# Patient Record
Sex: FEMALE | Race: WHITE | NOT HISPANIC OR LATINO | Employment: UNEMPLOYED | URBAN - METROPOLITAN AREA
[De-identification: names, ages, dates, MRNs, and addresses within clinical notes are randomized per-mention and may not be internally consistent; named-entity substitution may affect disease eponyms.]

---

## 2019-05-24 ENCOUNTER — TELEPHONE (OUTPATIENT)
Dept: SCHEDULING | Facility: IMAGING CENTER | Age: 54
End: 2019-05-24

## 2019-09-06 ENCOUNTER — HOSPITAL ENCOUNTER (OUTPATIENT)
Facility: MEDICAL CENTER | Age: 54
End: 2019-09-06
Attending: INTERNAL MEDICINE
Payer: COMMERCIAL

## 2019-09-06 ENCOUNTER — OFFICE VISIT (OUTPATIENT)
Dept: MEDICAL GROUP | Facility: MEDICAL CENTER | Age: 54
End: 2019-09-06
Payer: COMMERCIAL

## 2019-09-06 VITALS
OXYGEN SATURATION: 98 % | BODY MASS INDEX: 26.96 KG/M2 | WEIGHT: 171.74 LBS | SYSTOLIC BLOOD PRESSURE: 100 MMHG | HEART RATE: 72 BPM | TEMPERATURE: 97.3 F | DIASTOLIC BLOOD PRESSURE: 60 MMHG | HEIGHT: 67 IN

## 2019-09-06 DIAGNOSIS — E55.9 HYPOVITAMINOSIS D: ICD-10-CM

## 2019-09-06 DIAGNOSIS — M79.2 PERIPHERAL NEUROPATHIC PAIN: ICD-10-CM

## 2019-09-06 DIAGNOSIS — R53.83 FATIGUE, UNSPECIFIED TYPE: ICD-10-CM

## 2019-09-06 DIAGNOSIS — Z12.39 SCREENING FOR MALIGNANT NEOPLASM OF BREAST: ICD-10-CM

## 2019-09-06 DIAGNOSIS — M54.2 NECK PAIN: ICD-10-CM

## 2019-09-06 DIAGNOSIS — Z00.00 HEALTH CARE MAINTENANCE: ICD-10-CM

## 2019-09-06 DIAGNOSIS — E78.5 DYSLIPIDEMIA: ICD-10-CM

## 2019-09-06 DIAGNOSIS — Z76.89 ENCOUNTER TO ESTABLISH CARE: ICD-10-CM

## 2019-09-06 DIAGNOSIS — Z11.59 NEED FOR HEPATITIS C SCREENING TEST: ICD-10-CM

## 2019-09-06 DIAGNOSIS — K76.0 FATTY LIVER: ICD-10-CM

## 2019-09-06 DIAGNOSIS — M17.9 OSTEOARTHRITIS OF KNEE, UNSPECIFIED LATERALITY, UNSPECIFIED OSTEOARTHRITIS TYPE: ICD-10-CM

## 2019-09-06 PROCEDURE — 80307 DRUG TEST PRSMV CHEM ANLYZR: CPT

## 2019-09-06 PROCEDURE — 99204 OFFICE O/P NEW MOD 45 MIN: CPT | Performed by: INTERNAL MEDICINE

## 2019-09-06 RX ORDER — OXYCODONE HYDROCHLORIDE AND ACETAMINOPHEN 5; 325 MG/1; MG/1
1-2 TABLET ORAL EVERY 4 HOURS PRN
COMMUNITY
End: 2019-10-09 | Stop reason: SDUPTHER

## 2019-09-06 RX ORDER — CARISOPRODOL 350 MG/1
350 TABLET ORAL 4 TIMES DAILY
COMMUNITY
End: 2021-04-29 | Stop reason: SDUPTHER

## 2019-09-06 RX ORDER — FLUCONAZOLE 10 MG/ML
6 POWDER, FOR SUSPENSION ORAL DAILY
COMMUNITY
End: 2021-04-29 | Stop reason: SDUPTHER

## 2019-09-06 RX ORDER — OXYCODONE HYDROCHLORIDE 5 MG/1
5 TABLET ORAL EVERY 4 HOURS PRN
COMMUNITY
End: 2019-10-09

## 2019-09-06 RX ORDER — DIAZEPAM 5 MG/1
5 TABLET ORAL EVERY 6 HOURS PRN
COMMUNITY
End: 2019-10-09

## 2019-09-06 ASSESSMENT — PATIENT HEALTH QUESTIONNAIRE - PHQ9: CLINICAL INTERPRETATION OF PHQ2 SCORE: 0

## 2019-09-06 NOTE — PROGRESS NOTES
CHIEF COMPLAINT  Chief Complaint   Patient presents with   • Establish Care     New Patient     HPI  Patient is a 54 y.o. female patient who presents today for the following     Neck pain, knee pain, neuropathic pain, fatigue  - Onset: remote  - Triger: multiple procedures   -Unknown etiology for neuropathic pain  - located in: lower back  - intensity:  mild to moderate  - quality:  dull and sharp   - radiation:  no  - alleviating factors are:  rest, pain medication occasionally, noted daily   -She weaned her off of all medication, except the above  -  exacerbating factors are:  activity  - accompanied:    - no numbness, weakness, tingling, fever, chills  - course: Improved but persistent  - imaging: Multiple  - treatment: as above    No reported history of:  - chronic immune suppression, alcoholism, IV drug abuse, indwelling catheter, diabetes.    - No history of recent spinal surgery or injection.    Denies:  - numbness/saddle anesthesia.  - bowel/bladder changes, fever.   - trauma  - nausea/vomiting  - chest pain, shortness of breath, abdominal pain.       Hypovitaminosis D  The patient had low vitamin D level.  Vitamin D supplement: no.    Dyslipidemia, fatty liver  The patient had abnormal FLP, no medications  Diet: Healthy  Exercise: Limited due to pain  FH: Parents  Previous imaging showed fatty liver.    Reviewed PMH, PSH, FH, SH, ALL, HCM/IMM, no changes  Reviewed MEDS, no changes    CURRENT MEDICATIONS  Current Outpatient Medications   Medication Sig Dispense Refill   • oxyCODONE-acetaminophen (PERCOCET) 5-325 MG Tab Take 1-2 Tabs by mouth every four hours as needed.     • diazePAM (VALIUM) 5 MG Tab Take 5 mg by mouth every 6 hours as needed for Anxiety.     • fluconazole (DIFLUCAN) 10 MG/ML Recon Susp Take 6 mg/kg by mouth every day.     • oxyCODONE immediate-release (ROXICODONE) 5 MG Tab Take 5 mg by mouth every four hours as needed for Severe Pain.     • metroNIDAZOLE (FLAGYL) 5-0.79 MG/ML-% Solution  500 mg by Intravenous route every 8 hours.     • carisoprodol (SOMA) 350 MG Tab Take 350 mg by mouth 4 times a day.       No current facility-administered medications for this visit.      ALLERGIES  Allergies: Bee venom; Codeine; Iodine; Penicillins; Vancomycin; Aspirin; Ibuprofen; Latex; and Percocet [apap-fd&c red #40 [place]-oxycodone]  PAST MEDICAL HISTORY  Past Medical History:   Diagnosis Date   • Dyslipidemia    • Fatty liver    • Hypoglycemia    • Hypovitaminosis D    • Knee osteoarthritis    • Peripheral neuropathy      SURGICAL HISTORY  She  has a past surgical history that includes primary c section and ankle arthroscopy.  SOCIAL HISTORY  Social History     Tobacco Use   • Smoking status: Not on file   Substance Use Topics   • Alcohol use: Not on file   • Drug use: Not on file     Social History     Social History Narrative   • Not on file     FAMILY HISTORY  Family History   Problem Relation Age of Onset   • Diabetes Mother    • Hypertension Mother    • Hyperlipidemia Mother    • Heart Disease Father    • Hypertension Father    • Hyperlipidemia Father    • No Known Problems Sister    • No Known Problems Brother      Family Status   Relation Name Status   • Mo  Alive   • Fa  Alive   • Sis  Alive   • Bro  Alive       ROS   Constitutional: Negative for fever, chills.  HENT: Negative for congestion, sore throat.  Eyes: Negative for blurred vision.   Respiratory: Negative for cough, shortness of breath.  Cardiovascular: Negative for chest pain, palpitations.   Gastrointestinal: Negative for heartburn, nausea, abdominal pain.   Genitourinary: Negative for dysuria.  Musculoskeletal: as above.  Skin: Negative for rash and itching.   Neuro: Negative for dizziness, weakness and headaches.   Endo/Heme/Allergies: Does not bruise/bleed easily.   Psychiatric/Behavioral: Negative for depression, anxiety    PHYSICAL EXAM   Blood Pressure 100/60   Pulse 72   Temperature 36.3 °C (97.3 °F) (Temporal)   Height 1.702 m  "(5' 7\")   Weight 77.9 kg (171 lb 11.8 oz)   Oxygen Saturation 98%  Body mass index is 26.9 kg/m².  General:  NAD, well appearing  HEENT:   NC/AT, PERRLA, EOMI, TMs are clear. Oropharyngeal mucosa is pink,  without lesions;  no cervical / supraclavicular  lymphadenopathy, no thyromegaly.    Cardiovascular: RRR.   No m/r/g. No carotid bruits .      Lungs:   CTAB, no w/r/r, no respiratory distress.  Abdomen: Soft, NT/ND + BS; no suprapubic tenderness; no masses or hepatosplenomegaly.  Extremities:  2+ DP and radial pulses bilaterally.  No c/c/e.   Skin:  Warm, dry.  No erythema. No rash.   Neurologic: Alert & oriented x 3. CN II-XII grossly intact. No focal deficits.  Psychiatric:  Affect normal, mood normal, judgment normal.  MS: no TTP over spine/paraspinal muscles; muscle spasm.    LABS     Pending    IMAGING     None    ASSESMENT AND PLAN        1. Neck pain  On oxycodone as needed, not daily  - Controlled Substance Treatment Agreement  - Pain Management Screen; Future  2. Osteoarthritis of knee, unspecified laterality, unspecified osteoarthritis type  3. Peripheral neuropathic pain  - Controlled Substance Treatment Agreement  - Pain Management Screen; Future    4. Fatigue, unspecified type  Pending labs  - CBC WITH DIFFERENTIAL; Future  - TSH; Future    5. Hypovitaminosis D  Pending the labs  - VITAMIN D,25 HYDROXY; Future    6. Dyslipidemia  Pending labs, advised to continue current lifestyle, and exercise as tolerated  - Comp Metabolic Panel; Future  - Lipid Profile; Future    7. Fatty liver  Pending labs    8. Screening for malignant neoplasm of breast  - MA-SCREEN MAMMO W/CAD-BILAT; Future    9. Need for hepatitis C screening test  - HEP C VIRUS ANTIBODY; Future    10. Health care maintenance  Pending records for colonoscopy    11. Encounter to establish care  Reviewed PMH, PSH, FH, SH, ALL, MEDS, HCM/IMM.     Counseling:   - Smoking:  Nonsmoker    Followup: Return in about 4 weeks (around 10/4/2019).    All " questions are answered.    Please note that this dictation was created using voice recognition software, and that there might be errors of marisa and possibly content.

## 2019-09-07 ENCOUNTER — HOSPITAL ENCOUNTER (OUTPATIENT)
Dept: LAB | Facility: MEDICAL CENTER | Age: 54
End: 2019-09-07
Attending: INTERNAL MEDICINE
Payer: COMMERCIAL

## 2019-09-07 DIAGNOSIS — R53.83 FATIGUE, UNSPECIFIED TYPE: ICD-10-CM

## 2019-09-07 DIAGNOSIS — Z12.39 SCREENING FOR MALIGNANT NEOPLASM OF BREAST: ICD-10-CM

## 2019-09-07 DIAGNOSIS — E55.9 HYPOVITAMINOSIS D: ICD-10-CM

## 2019-09-07 DIAGNOSIS — E78.5 DYSLIPIDEMIA: ICD-10-CM

## 2019-09-07 PROBLEM — Z00.00 HEALTH CARE MAINTENANCE: Status: ACTIVE | Noted: 2019-09-07

## 2019-09-07 PROBLEM — M54.2 NECK PAIN: Status: ACTIVE | Noted: 2019-09-07

## 2019-09-07 PROBLEM — M79.2 PERIPHERAL NEUROPATHIC PAIN: Status: ACTIVE | Noted: 2019-09-07

## 2019-09-07 LAB
25(OH)D3 SERPL-MCNC: 23 NG/ML (ref 30–100)
ALBUMIN SERPL BCP-MCNC: 4.3 G/DL (ref 3.2–4.9)
ALBUMIN/GLOB SERPL: 1.7 G/DL
ALP SERPL-CCNC: 54 U/L (ref 30–99)
ALT SERPL-CCNC: 11 U/L (ref 2–50)
ANION GAP SERPL CALC-SCNC: 8 MMOL/L (ref 0–11.9)
AST SERPL-CCNC: 16 U/L (ref 12–45)
BASOPHILS # BLD AUTO: 0.8 % (ref 0–1.8)
BASOPHILS # BLD: 0.03 K/UL (ref 0–0.12)
BILIRUB SERPL-MCNC: 0.9 MG/DL (ref 0.1–1.5)
BUN SERPL-MCNC: 20 MG/DL (ref 8–22)
CALCIUM SERPL-MCNC: 9.1 MG/DL (ref 8.5–10.5)
CHLORIDE SERPL-SCNC: 107 MMOL/L (ref 96–112)
CHOLEST SERPL-MCNC: 238 MG/DL (ref 100–199)
CO2 SERPL-SCNC: 23 MMOL/L (ref 20–33)
CREAT SERPL-MCNC: 0.81 MG/DL (ref 0.5–1.4)
EOSINOPHIL # BLD AUTO: 0.13 K/UL (ref 0–0.51)
EOSINOPHIL NFR BLD: 3.5 % (ref 0–6.9)
ERYTHROCYTE [DISTWIDTH] IN BLOOD BY AUTOMATED COUNT: 43.2 FL (ref 35.9–50)
FASTING STATUS PATIENT QL REPORTED: NORMAL
GLOBULIN SER CALC-MCNC: 2.5 G/DL (ref 1.9–3.5)
GLUCOSE SERPL-MCNC: 93 MG/DL (ref 65–99)
HCT VFR BLD AUTO: 43.6 % (ref 37–47)
HCV AB SER QL: NEGATIVE
HDLC SERPL-MCNC: 62 MG/DL
HGB BLD-MCNC: 14.2 G/DL (ref 12–16)
IMM GRANULOCYTES # BLD AUTO: 0.01 K/UL (ref 0–0.11)
IMM GRANULOCYTES NFR BLD AUTO: 0.3 % (ref 0–0.9)
LDLC SERPL CALC-MCNC: 159 MG/DL
LYMPHOCYTES # BLD AUTO: 1.72 K/UL (ref 1–4.8)
LYMPHOCYTES NFR BLD: 45.9 % (ref 22–41)
MCH RBC QN AUTO: 29.9 PG (ref 27–33)
MCHC RBC AUTO-ENTMCNC: 32.6 G/DL (ref 33.6–35)
MCV RBC AUTO: 91.8 FL (ref 81.4–97.8)
MONOCYTES # BLD AUTO: 0.32 K/UL (ref 0–0.85)
MONOCYTES NFR BLD AUTO: 8.5 % (ref 0–13.4)
NEUTROPHILS # BLD AUTO: 1.54 K/UL (ref 2–7.15)
NEUTROPHILS NFR BLD: 41 % (ref 44–72)
NRBC # BLD AUTO: 0 K/UL
NRBC BLD-RTO: 0 /100 WBC
PLATELET # BLD AUTO: 210 K/UL (ref 164–446)
PMV BLD AUTO: 9.6 FL (ref 9–12.9)
POTASSIUM SERPL-SCNC: 4 MMOL/L (ref 3.6–5.5)
PROT SERPL-MCNC: 6.8 G/DL (ref 6–8.2)
RBC # BLD AUTO: 4.75 M/UL (ref 4.2–5.4)
SODIUM SERPL-SCNC: 138 MMOL/L (ref 135–145)
TRIGL SERPL-MCNC: 83 MG/DL (ref 0–149)
TSH SERPL DL<=0.005 MIU/L-ACNC: 2.11 UIU/ML (ref 0.38–5.33)
WBC # BLD AUTO: 3.8 K/UL (ref 4.8–10.8)

## 2019-09-07 PROCEDURE — 84443 ASSAY THYROID STIM HORMONE: CPT

## 2019-09-07 PROCEDURE — 36415 COLL VENOUS BLD VENIPUNCTURE: CPT

## 2019-09-07 PROCEDURE — 82306 VITAMIN D 25 HYDROXY: CPT

## 2019-09-07 PROCEDURE — 80053 COMPREHEN METABOLIC PANEL: CPT

## 2019-09-07 PROCEDURE — 85025 COMPLETE CBC W/AUTO DIFF WBC: CPT

## 2019-09-07 PROCEDURE — 86803 HEPATITIS C AB TEST: CPT

## 2019-09-07 PROCEDURE — 80061 LIPID PANEL: CPT

## 2019-09-11 LAB
6MAM UR QL: NOT DETECTED
7AMINOCLONAZEPAM UR QL: NOT DETECTED
A-OH ALPRAZ UR QL: NOT DETECTED
ALPRAZ UR QL: NOT DETECTED
AMPHET UR QL SCN: NOT DETECTED
ANNOTATION COMMENT IMP: NORMAL
ANNOTATION COMMENT IMP: NORMAL
BARBITURATES UR QL: NOT DETECTED
BUPRENORPHINE UR QL: NOT DETECTED
BZE UR QL: NOT DETECTED
CARBOXYTHC UR QL: NOT DETECTED
CARISOPRODOL UR QL: NOT DETECTED
CLONAZEPAM UR QL: NOT DETECTED
CODEINE UR QL: NOT DETECTED
DIAZEPAM UR QL: NOT DETECTED
ETHYL GLUCURONIDE UR QL: NOT DETECTED
FENTANYL UR QL: NOT DETECTED
HYDROCODONE UR QL: NOT DETECTED
HYDROMORPHONE UR QL: NOT DETECTED
LORAZEPAM UR QL: NOT DETECTED
MDA UR QL: NOT DETECTED
MDEA UR QL: NOT DETECTED
MDMA UR QL: NOT DETECTED
MEPERIDINE UR QL: NOT DETECTED
METHADONE UR QL: NOT DETECTED
METHAMPHET UR QL: NOT DETECTED
MIDAZOLAM UR QL SCN: NOT DETECTED
MORPHINE UR QL: NOT DETECTED
NORBUPRENORPHINE UR QL CFM: NOT DETECTED
NORDIAZEPAM UR QL: NOT DETECTED
NORFENTANYL UR QL: NOT DETECTED
NORHYDROCODONE UR QL CFM: NOT DETECTED
NOROXYCODONE UR QL CFM: NOT DETECTED
NOROXYMORPH CO100 Q0458: NOT DETECTED
OXAZEPAM UR QL: NOT DETECTED
OXYCODONE UR QL: NOT DETECTED
OXYMORPHONE UR QL: NOT DETECTED
PATHOLOGY STUDY: NORMAL
PCP UR QL: NOT DETECTED
PHENTERMINE UR QL: NOT DETECTED
PPAA UR QL: NOT DETECTED
PROPOXYPH UR QL: NOT DETECTED
SERVICE CMNT-IMP: NORMAL
TAPENADOL OSULF CO200 Q0473: NOT DETECTED
TAPENTADOL UR QL SCN: NOT DETECTED
TEMAZEPAM UR QL: NOT DETECTED
TRAMADOL UR QL: NOT DETECTED
ZOLPIDEM UR QL: NOT DETECTED

## 2019-10-09 ENCOUNTER — OFFICE VISIT (OUTPATIENT)
Dept: MEDICAL GROUP | Facility: MEDICAL CENTER | Age: 54
End: 2019-10-09
Payer: COMMERCIAL

## 2019-10-09 VITALS
HEIGHT: 67 IN | HEART RATE: 87 BPM | WEIGHT: 173.72 LBS | BODY MASS INDEX: 27.27 KG/M2 | OXYGEN SATURATION: 93 % | DIASTOLIC BLOOD PRESSURE: 72 MMHG | TEMPERATURE: 97.7 F | SYSTOLIC BLOOD PRESSURE: 120 MMHG

## 2019-10-09 DIAGNOSIS — E55.9 HYPOVITAMINOSIS D: ICD-10-CM

## 2019-10-09 DIAGNOSIS — E78.00 HYPERCHOLESTEROLEMIA: ICD-10-CM

## 2019-10-09 DIAGNOSIS — Z00.00 HEALTHCARE MAINTENANCE: ICD-10-CM

## 2019-10-09 DIAGNOSIS — K76.0 FATTY LIVER: ICD-10-CM

## 2019-10-09 DIAGNOSIS — M62.838 MUSCLE SPASM: ICD-10-CM

## 2019-10-09 DIAGNOSIS — M79.2 PERIPHERAL NEUROPATHIC PAIN: ICD-10-CM

## 2019-10-09 DIAGNOSIS — M54.2 NECK PAIN: ICD-10-CM

## 2019-10-09 DIAGNOSIS — Z79.899 CONTROLLED SUBSTANCE AGREEMENT SIGNED: ICD-10-CM

## 2019-10-09 DIAGNOSIS — D72.819 LEUKOPENIA, UNSPECIFIED TYPE: ICD-10-CM

## 2019-10-09 PROCEDURE — 99214 OFFICE O/P EST MOD 30 MIN: CPT | Performed by: INTERNAL MEDICINE

## 2019-10-09 RX ORDER — CARISOPRODOL 350 MG/1
350 TABLET ORAL 2 TIMES DAILY
Qty: 60 TAB | Refills: 0 | Status: SHIPPED | OUTPATIENT
Start: 2019-10-09 | End: 2020-03-02 | Stop reason: SDUPTHER

## 2019-10-09 RX ORDER — OXYCODONE HYDROCHLORIDE AND ACETAMINOPHEN 5; 325 MG/1; MG/1
1-2 TABLET ORAL EVERY 4 HOURS PRN
Qty: 60 TAB | Refills: 0 | Status: SHIPPED | OUTPATIENT
Start: 2019-10-09 | End: 2019-10-16 | Stop reason: SDUPTHER

## 2019-10-09 NOTE — PROGRESS NOTES
CHIEF COMPLAINT  Chief Complaint   Patient presents with   • Other     still tired, Can't sleep but 2 hours a night   • Medication Refill     HPI  Serena Rogers is a 54 y.o. female who presents today for the following     Neck pain, neuropathic pain, muscle spasm  Interval course  - Came for the first medication refill  - oxycodone, 3/25 mg BID prn, refill: 10/9/2019  -Controlled substance agreement: 9/6/2019  -Urine drug screen:   9/6/2019  -Opioid risk tool, 0:   10/9/2019    - Onset: remote  - Triger: multiple procedures              -Unknown etiology for neuropathic pain  - located in: lower back  - intensity: mild to moderate  - quality: dull and sharp   - radiation: no  - alleviating factors are: rest, pain medication occasionally, noted daily              -She weaned her off of all medication, except the above  - exacerbating factors are: activity  - accompanied:               - no numbness, weakness, tingling, fever, chills  - course: Improved but persistent  - imaging: Multiple  - treatment: as above     No reported history of:  - chronic immune suppression, alcoholism, IV drug abuse, indwelling catheter, diabetes.   - No history of recent spinal surgery or injection.     Denies:  - numbness/saddle anesthesia.  - bowel/bladder changes, fever.   - trauma  - nausea/vomiting  - chest pain, shortness of breath, abdominal pain.     Leukopenia  The patient had borderline low white blood cell count, without frequent infections, anorexia, weight loss.     Hypercholesterolemia, fatty liver  The patient had abnormal FLP, no medications  Diet: Healthy  Exercise: Limited due to pain  FH: Parents  Previous imaging showed fatty liver.     Hypovitaminosis D  The patient had low vitamin D level at 23.  Vitamin D supplement: started 1 month ago, OTC.    Reviewed PMH, PSH, FH, SH, ALL, HCM/IMM, no changes  Reviewed MEDS, no changes    Patient Active Problem List    Diagnosis Date Noted   • Neck pain 09/07/2019   •  Peripheral neuropathic pain 09/07/2019   • Fatigue 09/07/2019   • Dyslipidemia 09/07/2019   • Health care maintenance 09/07/2019     CURRENT MEDICATIONS  Current Outpatient Medications   Medication Sig Dispense Refill   • oxyCODONE-acetaminophen (PERCOCET) 5-325 MG Tab Take 1-2 Tabs by mouth every four hours as needed for up to 30 days. 60 Tab 0   • carisoprodol (SOMA) 350 MG Tab Take 1 Tab by mouth 2 Times a Day for 30 days. 60 Tab 0   • fluconazole (DIFLUCAN) 10 MG/ML Recon Susp Take 6 mg/kg by mouth every day.     • metroNIDAZOLE (FLAGYL) 5-0.79 MG/ML-% Solution 500 mg by Intravenous route every 8 hours.     • carisoprodol (SOMA) 350 MG Tab Take 350 mg by mouth 4 times a day.       No current facility-administered medications for this visit.      ALLERGIES  Allergies: Bee venom; Codeine; Iodine; Penicillins; Vancomycin; Aspirin; Ibuprofen; Latex; and Percocet [apap-fd&c red #40 [place]-oxycodone]  PAST MEDICAL HISTORY  Past Medical History:   Diagnosis Date   • Dyslipidemia    • Fatty liver    • Hypoglycemia    • Hypovitaminosis D    • Knee osteoarthritis    • Peripheral neuropathy      SURGICAL HISTORY  She  has a past surgical history that includes primary c section and ankle arthroscopy.  SOCIAL HISTORY  Social History     Tobacco Use   • Smoking status: Never Smoker   • Smokeless tobacco: Never Used   Substance Use Topics   • Alcohol use: Not on file   • Drug use: Not on file     Social History     Social History Narrative   • Not on file     FAMILY HISTORY  Family History   Problem Relation Age of Onset   • Diabetes Mother    • Hypertension Mother    • Hyperlipidemia Mother    • Heart Disease Father    • Hypertension Father    • Hyperlipidemia Father    • No Known Problems Sister    • No Known Problems Brother      Family Status   Relation Name Status   • Mo  Alive   • Fa  Alive   • Sis  Alive   • Bro  Alive     ROS   Constitutional: Negative for fever, chills, fatigue.  HENT: Negative for congestion, sore  "throat.  Eyes: Negative for vision problems.   Respiratory: Negative for cough, shortness of breath.  Cardiovascular: Negative for chest pain, palpitations.   Gastrointestinal: Negative for heartburn, nausea, abdominal pain.   Genitourinary: Negative for dysuria.  Musculoskeletal: As above.   Skin: Negative for rash.   Neuro: Negative for dizziness, weakness and headaches.   Endo/Heme/Allergies: Does not bruise/bleed easily.   Psychiatric/Behavioral: Negative for depression.    PHYSICAL EXAM   Blood Pressure 120/72   Pulse 87   Temperature 36.5 °C (97.7 °F) (Temporal)   Height 1.702 m (5' 7\")   Weight 78.8 kg (173 lb 11.6 oz)   Oxygen Saturation 93%  Body mass index is 27.21 kg/m².  General:  NAD, well appearing  HEENT:   NC/AT, PERRLA, EOMI, TMs are clear. Oropharyngeal mucosa is pink,  without lesions;  no cervical / supraclavicular  lymphadenopathy, no thyromegaly.    Cardiovascular: RRR.   No m/r/g.       Lungs:   CTAB, no w/r/r, no respiratory distress.  Abdomen: Soft, NT/ND; no hepatosplenomegaly.  Extremities:  2+ DP and radial pulses bilaterally.  No c/c/e.   Skin:  Warm, dry.  No erythema. No rash.   Neurologic: Alert & oriented x 3. CN II-XII grossly intact. No focal deficits.  Psychiatric:  Affect normal, mood normal, judgment normal.  MS: No tenderness to palpation over the spine or paraspinal muscles    Labs     Labs are reviewed and discussed with a patient  Lab Results   Component Value Date/Time    CHOLSTRLTOT 238 (H) 09/07/2019 09:09 AM     (H) 09/07/2019 09:09 AM    HDL 62 09/07/2019 09:09 AM    TRIGLYCERIDE 83 09/07/2019 09:09 AM       Lab Results   Component Value Date/Time    SODIUM 138 09/07/2019 09:09 AM    POTASSIUM 4.0 09/07/2019 09:09 AM    CHLORIDE 107 09/07/2019 09:09 AM    CO2 23 09/07/2019 09:09 AM    GLUCOSE 93 09/07/2019 09:09 AM    BUN 20 09/07/2019 09:09 AM    CREATININE 0.81 09/07/2019 09:09 AM     Lab Results   Component Value Date/Time    ALKPHOSPHAT 54 09/07/2019 " 09:09 AM    ASTSGOT 16 09/07/2019 09:09 AM    ALTSGPT 11 09/07/2019 09:09 AM    TBILIRUBIN 0.9 09/07/2019 09:09 AM      Lab Results   Component Value Date/Time    WBC 3.8 (L) 09/07/2019 09:09 AM    RBC 4.75 09/07/2019 09:09 AM    HEMOGLOBIN 14.2 09/07/2019 09:09 AM    HEMATOCRIT 43.6 09/07/2019 09:09 AM    MCV 91.8 09/07/2019 09:09 AM    MCH 29.9 09/07/2019 09:09 AM    MCHC 32.6 (L) 09/07/2019 09:09 AM    MPV 9.6 09/07/2019 09:09 AM    NEUTSPOLYS 41.00 (L) 09/07/2019 09:09 AM    LYMPHOCYTES 45.90 (H) 09/07/2019 09:09 AM    MONOCYTES 8.50 09/07/2019 09:09 AM    EOSINOPHILS 3.50 09/07/2019 09:09 AM    BASOPHILS 0.80 09/07/2019 09:09 AM      Imaging     None    Assessment and Plan     Serena Rogers is a 54 y.o. female    1. Neck pain  Controlled with small dose of Percocet, continue current treatment  - oxyCODONE-acetaminophen (PERCOCET) 5-325 MG Tab; Take 1-2 Tabs by mouth every four hours as needed for up to 30 days.  Dispense: 60 Tab; Refill: 0  2. Peripheral neuropathic pain  - oxyCODONE-acetaminophen (PERCOCET) 5-325 MG Tab; Take 1-2 Tabs by mouth every four hours as needed for up to 30 days.  Dispense: 60 Tab; Refill: 0  2. Peripheral neuropathic pain  - oxyCODONE-acetaminophen (PERCOCET) 5-325 MG Tab; Take 1-2 Tabs by mouth every four hours as needed for up to 30 days.  Dispense: 60 Tab; Refill: 0    3. Muscle spasm  Controlled, continue current treatment  - carisoprodol (SOMA) 350 MG Tab; Take 1 Tab by mouth 2 Times a Day for 30 days.  Dispense: 60 Tab; Refill: 0    4. Controlled substance agreement signed  Obtained and reviewed patient utilization report from [facility] pharmacy database on 10/9/2019 10:25 AM  prior to writing prescription for controlled substance II, III or IV per [place] bill . Based on assessment of the report, the prescription is medically necessary.     5. Leukopenia, unspecified type  Borderline, follow-up labs  - CBC WITH DIFFERENTIAL; Future    6.  Hypercholesterolemia  Discussed treatment options, patient prefers lifestyle modification with low calorie diet, daily exercise, weight control  - Comp Metabolic Panel; Future  - Lipid Profile; Future    7. Fatty liver  As above    8. Hypovitaminosis D  Continue current supplement, follow-up labs  - VITAMIN D,25 HYDROXY; Future    9.  Healthcare maintenance  Pending records for colonoscopy    Counseling:   - Smoking:  Nonsmoker    Followup: as needed for pain med refill    All questions are answered.    Please note that this dictation was created using voice recognition software, and that there might be errors of marisa and possibly content.

## 2019-10-09 NOTE — LETTER
[facility]  Mat Disla M.D.  [address]  [place] [address]  Fax: [phone]   Authorization for Release/Disclosure of   Protected Health Information   Name: SERENA RAMESH : 1965 SSN: [id number]   Address: [address]  [place] [address] Phone:    [phone] (home)    I authorize the entity listed below to release/disclose the PHI below to:   [facility]/Mat Disla M.D. and Mat Disla M.D.   Provider or Entity Name:  Dr. Gregorio Acuña     [facility]   Phone:  [phone]    Fax:  [phone]   Reason for request: continuity of care   Information to be released:    [  ] LAST COLONOSCOPY,  including any PATH REPORT and follow-up  [  ] LAST FIT/COLOGUARD RESULT [  ] LAST DEXA  [  ] LAST MAMMOGRAM  [  ] LAST PAP  [  ] LAST LABS [  ] RETINA EXAM REPORT  [  ] IMMUNIZATION RECORDS  [  ] Release all info      [  ] Check here and initial the line next to each item to release ALL health information INCLUDING  _____ Care and treatment for drug and / or alcohol abuse  _____ HIV testing, infection status, or AIDS  _____ Genetic Testing    DATES OF SERVICE OR TIME PERIOD TO BE DISCLOSED: _____________  I understand and acknowledge that:  * This Authorization may be revoked at any time by you in writing, except if your health information has already been used or disclosed.  * Your health information that will be used or disclosed as a result of you signing this authorization could be re-disclosed by the recipient. If this occurs, your re-disclosed health information may no longer be protected by State or Federal laws.  * You may refuse to sign this Authorization. Your refusal will not affect your ability to obtain treatment.  * This Authorization becomes effective upon signing and will  on (date) __________.      If no date is indicated, this Authorization will  one (1) year from the signature date.    Name: Serena Cheney  Ken    Signature:   Date:     10/9/2019       PLEASE FAX REQUESTED RECORDS BACK TO: [phone]

## 2019-10-09 NOTE — LETTER
[facility]  Mat Disla M.D.  [address]  [place] [address]  Fax: [phone]   Authorization for Release/Disclosure of   Protected Health Information   Name: SERENA RAMESH : 1965 SSN: [id number]   Address: [address]  [place] [address] Phone:    [phone] (home)    I authorize the entity listed below to release/disclose the PHI below to:   [facility]/Mat Disla M.D. and Mat Disla M.D.   Provider or Entity Name: Pain Management  Dr. John Hughes     [facility]   Phone:  [phone]    Fax:     Reason for request: continuity of care   Information to be released:    [  ] LAST COLONOSCOPY,  including any PATH REPORT and follow-up  [  ] LAST FIT/COLOGUARD RESULT [  ] LAST DEXA  [  ] LAST MAMMOGRAM  [  ] LAST PAP  [  ] LAST LABS [  ] RETINA EXAM REPORT  [  ] IMMUNIZATION RECORDS  [  ] Release all info      [  ] Check here and initial the line next to each item to release ALL health information INCLUDING  _____ Care and treatment for drug and / or alcohol abuse  _____ HIV testing, infection status, or AIDS  _____ Genetic Testing    DATES OF SERVICE OR TIME PERIOD TO BE DISCLOSED: _____________  I understand and acknowledge that:  * This Authorization may be revoked at any time by you in writing, except if your health information has already been used or disclosed.  * Your health information that will be used or disclosed as a result of you signing this authorization could be re-disclosed by the recipient. If this occurs, your re-disclosed health information may no longer be protected by State or Federal laws.  * You may refuse to sign this Authorization. Your refusal will not affect your ability to obtain treatment.  * This Authorization becomes effective upon signing and will  on (date) __________.      If no date is indicated, this Authorization will  one (1) year from the signature date.    Name: Serena Cheney  Ken    Signature:   Date:     10/9/2019       PLEASE FAX REQUESTED RECORDS BACK TO: [phone]

## 2019-10-16 DIAGNOSIS — M54.2 NECK PAIN: ICD-10-CM

## 2019-10-16 DIAGNOSIS — M79.2 PERIPHERAL NEUROPATHIC PAIN: ICD-10-CM

## 2019-10-16 RX ORDER — OXYCODONE HYDROCHLORIDE AND ACETAMINOPHEN 5; 325 MG/1; MG/1
1-2 TABLET ORAL EVERY 4 HOURS PRN
Qty: 60 TAB | Refills: 0 | Status: CANCELLED | OUTPATIENT
Start: 2019-10-16 | End: 2019-11-15

## 2019-10-16 RX ORDER — OXYCODONE HYDROCHLORIDE AND ACETAMINOPHEN 5; 325 MG/1; MG/1
1-2 TABLET ORAL EVERY 4 HOURS PRN
Qty: 60 TAB | Refills: 0 | Status: SHIPPED | OUTPATIENT
Start: 2019-10-16 | End: 2020-05-26 | Stop reason: SDUPTHER

## 2019-10-17 ENCOUNTER — TELEPHONE (OUTPATIENT)
Dept: MEDICAL GROUP | Facility: MEDICAL CENTER | Age: 54
End: 2019-10-17

## 2019-10-17 NOTE — TELEPHONE ENCOUNTER
Rc'd request for prior authorization for oxycodone/APAP 5[facility]     Contacted pts pharmacy insurance [employer], Provided all details requested for rx. Pt is new to plan so they need ICD10's and to confirm pt has been evaluated for opioid risks and morphine equivalent oxycodone rx is written for (oxycondone 10 mg/day equivalent to morphine 15 mg daily)     They will fax us within 5 days if approved.     Cindy Carranza, PharmD

## 2019-10-21 NOTE — TELEPHONE ENCOUNTER
FINAL PRIOR AUTHORIZATION STATUS:    1.  Name of Medication & Dose: oxyCODONE-acetaminophen (PERCOCET) 5-325 MG Tab     2. Prior Auth Status: Approved through Until patient does not need it     3. Action Taken: Pharmacy Notified: yes Patient Notified: yes

## 2019-11-01 ENCOUNTER — TELEPHONE (OUTPATIENT)
Dept: MEDICAL GROUP | Facility: MEDICAL CENTER | Age: 54
End: 2019-11-01

## 2019-11-01 NOTE — TELEPHONE ENCOUNTER
FINAL PRIOR AUTHORIZATION STATUS:    1.  Name of Medication & Dose: Percocet     2. Prior Auth Status: Approved through as long as patient needs it     3. Action Taken: Pharmacy Notified: yes Patient Notified:yes

## 2019-12-26 ENCOUNTER — OFFICE VISIT (OUTPATIENT)
Dept: MEDICAL GROUP | Facility: MEDICAL CENTER | Age: 54
End: 2019-12-26
Payer: COMMERCIAL

## 2019-12-26 VITALS
BODY MASS INDEX: 27.68 KG/M2 | SYSTOLIC BLOOD PRESSURE: 118 MMHG | HEIGHT: 67 IN | TEMPERATURE: 98.7 F | HEART RATE: 82 BPM | WEIGHT: 176.37 LBS | RESPIRATION RATE: 12 BRPM | OXYGEN SATURATION: 95 % | DIASTOLIC BLOOD PRESSURE: 64 MMHG

## 2019-12-26 DIAGNOSIS — Z79.899 CONTROLLED SUBSTANCE AGREEMENT SIGNED: ICD-10-CM

## 2019-12-26 DIAGNOSIS — Z00.00 HEALTHCARE MAINTENANCE: ICD-10-CM

## 2019-12-26 DIAGNOSIS — M62.838 MUSCLE SPASM: ICD-10-CM

## 2019-12-26 DIAGNOSIS — M54.2 NECK PAIN: ICD-10-CM

## 2019-12-26 DIAGNOSIS — M79.2 PERIPHERAL NEUROPATHIC PAIN: ICD-10-CM

## 2019-12-26 PROCEDURE — 99214 OFFICE O/P EST MOD 30 MIN: CPT | Performed by: INTERNAL MEDICINE

## 2019-12-26 RX ORDER — MECLIZINE HYDROCHLORIDE 25 MG/1
25 TABLET ORAL 3 TIMES DAILY PRN
Qty: 30 TAB | Refills: 0 | Status: SHIPPED | OUTPATIENT
Start: 2019-12-26 | End: 2021-04-29 | Stop reason: SDUPTHER

## 2019-12-26 RX ORDER — OXYCODONE HYDROCHLORIDE 5 MG/1
5 TABLET ORAL EVERY 4 HOURS PRN
Qty: 60 TAB | Refills: 0 | Status: SHIPPED | OUTPATIENT
Start: 2019-12-26 | End: 2020-03-02 | Stop reason: SDUPTHER

## 2019-12-26 NOTE — PROGRESS NOTES
CHIEF COMPLAINT  Chief Complaint   Patient presents with   • Medication Refill     HPI  Patient is a 54 y.o. female patient who presents today for the following     Neck pain, neuropathic pain, muscle spasm  Interval course  - Came for medication refill  - oxycodone/acetaminophen, 3/25 mg BID prn, refill: 10/9/2019   - prefers oxycodone w/o acetaminophen    -Controlled substance agreement:     9/6/2019  -Urine drug screen:                             9/6/2019  -Opioid risk tool, 0:                              10/9/2019     - Onset: remote  - Triger: multiple procedures  -Unknown etiology for neuropathic pain  - located in: lower back  - intensity: mild to moderate  - quality: dull and sharp   - radiation: no  - alleviating factors are: rest, pain medication occasionally, noted daily  -She weaned her off of all medication, except the above  - exacerbating factors are: activity  - accompanied:   - no numbness, weakness, tingling, fever, chills  - course: Improved but persistent  - imaging: Multiple  - treatment: as above     No reported history of:  - chronic immune suppression, alcoholism, IV drug abuse, indwelling catheter, diabetes.   - No history of recent spinal surgery or injection.     Denies:  - numbness/saddle anesthesia.  - bowel/bladder changes, fever.   - trauma  - nausea/vomiting  - chest pain, shortness of breath, abdominal pain.      Reviewed PMH, PSH, FH, SH, ALL, HCM/IMM, no changes  Reviewed MEDS, no changes    Patient Active Problem List    Diagnosis Date Noted   • Fatty liver 10/09/2019   • Hypovitaminosis D 10/09/2019   • Hypercholesterolemia 10/09/2019   • Leukopenia 10/09/2019   • Controlled substance agreement signed 10/09/2019   • Muscle spasm 10/09/2019   • Healthcare maintenance 10/09/2019   • Neck pain 09/07/2019   • Peripheral neuropathic pain 09/07/2019   • Fatigue 09/07/2019   • Dyslipidemia 09/07/2019   • Health care maintenance 09/07/2019     CURRENT MEDICATIONS  Current Outpatient  Medications   Medication Sig Dispense Refill   • oxyCODONE immediate-release (ROXICODONE) 5 MG Tab Take 1 Tab by mouth every four hours as needed for Severe Pain for up to 30 days. 60 Tab 0   • meclizine (ANTIVERT) 25 MG Tab Take 1 Tab by mouth 3 times a day as needed. 30 Tab 0   • fluconazole (DIFLUCAN) 10 MG/ML Recon Susp Take 6 mg/kg by mouth every day.     • metroNIDAZOLE (FLAGYL) 5-0.79 MG/ML-% Solution 500 mg by Intravenous route every 8 hours.     • carisoprodol (SOMA) 350 MG Tab Take 350 mg by mouth 4 times a day.       No current facility-administered medications for this visit.      ALLERGIES  Allergies: Bee venom; Codeine; Iodine; Penicillins; Vancomycin; Aspirin; Ibuprofen; Latex; and Percocet [apap-fd&c red #40 [place]-oxycodone]  PAST MEDICAL HISTORY  Past Medical History:   Diagnosis Date   • Dyslipidemia    • Fatty liver    • Hypoglycemia    • Hypovitaminosis D    • Knee osteoarthritis    • Peripheral neuropathy      SURGICAL HISTORY  She  has a past surgical history that includes primary c section and ankle arthroscopy.  SOCIAL HISTORY  Social History     Tobacco Use   • Smoking status: Never Smoker   • Smokeless tobacco: Never Used   Substance Use Topics   • Alcohol use: Not on file   • Drug use: Not on file     Social History     Patient does not qualify to have social determinant information on file (likely too young).   Social History Narrative   • Not on file     FAMILY HISTORY  Family History   Problem Relation Age of Onset   • Diabetes Mother    • Hypertension Mother    • Hyperlipidemia Mother    • Heart Disease Father    • Hypertension Father    • Hyperlipidemia Father    • No Known Problems Sister    • No Known Problems Brother      Family Status   Relation Name Status   • Mo  Alive   • Fa  Alive   • Sis  Alive   • Bro  Alive     ROS   Constitutional: Negative for fever, chills.  HENT: Negative for congestion, sore throat.  Eyes: Negative for blurred vision.   Respiratory: Negative for  "cough, shortness of breath.  Cardiovascular: Negative for chest pain, palpitations.   Gastrointestinal: Negative for constipation.   Genitourinary: Negative for dysuria.  Musculoskeletal: as above.  Skin: Negative for rash and itching.   Neuro: Negative for dizziness, weakness and headaches.   Endo/Heme/Allergies: Does not bruise/bleed easily.   Psychiatric/Behavioral: Negative for depression, anxiety    PHYSICAL EXAM   Blood Pressure 118/64   Pulse 82   Temperature 37.1 °C (98.7 °F)   Respiration 12   Height 1.702 m (5' 7\")   Weight 80 kg (176 lb 5.9 oz)   Oxygen Saturation 95%  Body mass index is 27.62 kg/m².  General:  NAD, well appearing  HEENT:   NC/AT, PERRLA, EOMI, TMs are clear. Oropharyngeal mucosa is pink,  without lesions;  no cervical / supraclavicular  lymphadenopathy, no thyromegaly.    Cardiovascular: RRR.   No m/r/g. No carotid bruits .      Lungs:   CTAB, no w/r/r, no respiratory distress.  Abdomen: Soft, NT/ND + BS.  Extremities:  2+ DP and radial pulses bilaterally.  No c/c/e.   Skin:  Warm, dry.  No erythema. No rash.   Neurologic: Alert & oriented x 3. CN II-XII grossly intact. No focal deficits.  Psychiatric:  Affect normal, mood normal, judgment normal.    LABS     Labs are reviewed and discussed with a patient  Lab Results   Component Value Date/Time    CHOLSTRLTOT 238 (H) 09/07/2019 09:09 AM     (H) 09/07/2019 09:09 AM    HDL 62 09/07/2019 09:09 AM    TRIGLYCERIDE 83 09/07/2019 09:09 AM       Lab Results   Component Value Date/Time    SODIUM 138 09/07/2019 09:09 AM    POTASSIUM 4.0 09/07/2019 09:09 AM    CHLORIDE 107 09/07/2019 09:09 AM    CO2 23 09/07/2019 09:09 AM    GLUCOSE 93 09/07/2019 09:09 AM    BUN 20 09/07/2019 09:09 AM    CREATININE 0.81 09/07/2019 09:09 AM     Lab Results   Component Value Date/Time    ALKPHOSPHAT 54 09/07/2019 09:09 AM    ASTSGOT 16 09/07/2019 09:09 AM    ALTSGPT 11 09/07/2019 09:09 AM    TBILIRUBIN 0.9 09/07/2019 09:09 AM      No results found for: " HBA1C  No results found for: TSH  No results found for: FREET4    Lab Results   Component Value Date/Time    WBC 3.8 (L) 09/07/2019 09:09 AM    RBC 4.75 09/07/2019 09:09 AM    HEMOGLOBIN 14.2 09/07/2019 09:09 AM    HEMATOCRIT 43.6 09/07/2019 09:09 AM    MCV 91.8 09/07/2019 09:09 AM    MCH 29.9 09/07/2019 09:09 AM    MCHC 32.6 (L) 09/07/2019 09:09 AM    MPV 9.6 09/07/2019 09:09 AM    NEUTSPOLYS 41.00 (L) 09/07/2019 09:09 AM    LYMPHOCYTES 45.90 (H) 09/07/2019 09:09 AM    MONOCYTES 8.50 09/07/2019 09:09 AM    EOSINOPHILS 3.50 09/07/2019 09:09 AM    BASOPHILS 0.80 09/07/2019 09:09 AM      IMAGING     None    ASSESMENT AND PLAN        1. Neck pain  Controlled, continue with current treatment.  - oxyCODONE immediate-release (ROXICODONE) 5 MG Tab; Take 1 Tab by mouth every four hours as needed for Severe Pain for up to 30 days.  Dispense: 60 Tab; Refill: 0  2. Peripheral neuropathic pain  - oxyCODONE immediate-release (ROXICODONE) 5 MG Tab; Take 1 Tab by mouth every four hours as needed for Severe Pain for up to 30 days.  Dispense: 60 Tab; Refill: 0    3. Muscle spasm  Still has muscle relaxant, helped    4. Controlled substance agreement signed  - oxyCODONE immediate-release (ROXICODONE) 5 MG Tab; Take 1 Tab by mouth every four hours as needed for Severe Pain for up to 30 days.  Dispense: 60 Tab; Refill: 0    Obtained and reviewed patient utilization report from [facility] pharmacy database on 12/26/2019 12:00 PM  prior to writing prescription for controlled substance II, III or IV per [place] bill . Based on assessment of the report, the prescription is medically necessary.     5. Healthcare maintenance  Due immunizations x 2    Counseling:   - Smoking:  Nonsmoker    Followup: Return if symptoms worsen or fail to improve.    All questions are answered.    Please note that this dictation was created using voice recognition software, and that there might be errors of marisa and possibly content.

## 2020-03-02 ENCOUNTER — OFFICE VISIT (OUTPATIENT)
Dept: MEDICAL GROUP | Facility: MEDICAL CENTER | Age: 55
End: 2020-03-02
Payer: COMMERCIAL

## 2020-03-02 VITALS
DIASTOLIC BLOOD PRESSURE: 72 MMHG | HEIGHT: 67 IN | WEIGHT: 179.45 LBS | SYSTOLIC BLOOD PRESSURE: 124 MMHG | TEMPERATURE: 97.2 F | RESPIRATION RATE: 12 BRPM | BODY MASS INDEX: 28.17 KG/M2 | OXYGEN SATURATION: 97 % | HEART RATE: 88 BPM

## 2020-03-02 DIAGNOSIS — Z79.899 CONTROLLED SUBSTANCE AGREEMENT SIGNED: ICD-10-CM

## 2020-03-02 DIAGNOSIS — M62.838 MUSCLE SPASM: ICD-10-CM

## 2020-03-02 DIAGNOSIS — M79.2 PERIPHERAL NEUROPATHIC PAIN: ICD-10-CM

## 2020-03-02 DIAGNOSIS — Z12.39 SCREENING FOR MALIGNANT NEOPLASM OF BREAST: ICD-10-CM

## 2020-03-02 DIAGNOSIS — M54.2 NECK PAIN: ICD-10-CM

## 2020-03-02 DIAGNOSIS — R53.83 FATIGUE, UNSPECIFIED TYPE: ICD-10-CM

## 2020-03-02 PROCEDURE — 99214 OFFICE O/P EST MOD 30 MIN: CPT | Performed by: INTERNAL MEDICINE

## 2020-03-02 RX ORDER — CARISOPRODOL 350 MG/1
350 TABLET ORAL 2 TIMES DAILY
Qty: 60 TAB | Refills: 0 | Status: SHIPPED | OUTPATIENT
Start: 2020-03-02 | End: 2020-05-26 | Stop reason: SDUPTHER

## 2020-03-02 RX ORDER — OXYCODONE HYDROCHLORIDE 5 MG/1
5 TABLET ORAL EVERY 4 HOURS PRN
Qty: 75 TAB | Refills: 0 | Status: SHIPPED | OUTPATIENT
Start: 2020-03-02 | End: 2020-08-10 | Stop reason: SDUPTHER

## 2020-03-02 ASSESSMENT — FIBROSIS 4 INDEX: FIB4 SCORE: 1.26

## 2020-03-02 ASSESSMENT — PATIENT HEALTH QUESTIONNAIRE - PHQ9: CLINICAL INTERPRETATION OF PHQ2 SCORE: 0

## 2020-03-02 NOTE — PROGRESS NOTES
CHIEF COMPLAINT  Neck pain, pain medicine refill    HPI  Patient is a 54 y.o. female patient who presents today for the following     Neck pain, neuropathic pain, muscle spasm, fatigue  Interval course  - Came for oxycodone refill  - oxycodone, 3/25 mg BID prn, refill: 12/26/2019  -Controlled substance agreement:     9/6/2019  -Urine drug screen:                             9/6/2019  -Opioid risk tool, 0:                              10/9/2019     - Onset: remote  - Triger: multiple procedures  -Unknown etiology for neuropathic pain  - located in: lower back  - intensity: mild to moderate  - quality: dull and sharp   - radiation: no  - alleviating factors are: rest, pain medication occasionally, noted daily  -She weaned her off of all medication, except the above  - exacerbating factors are: activity  - accompanied:   - no numbness, weakness, tingling, fever, chills  - course: Improved but persistent  - imaging: Multiple  - treatment: as above     No reported history of:  - chronic immune suppression, alcoholism, IV drug abuse, indwelling catheter, diabetes.   - No history of recent spinal surgery or injection.     Denies:  - numbness/saddle anesthesia.  - bowel/bladder changes, fever.   - trauma  - nausea/vomiting  - chest pain, shortness of breath, abdominal pain.    Reviewed PMH, PSH, FH, SH, ALL, HCM/IMM, no changes  Reviewed MEDS, no changes    Patient Active Problem List    Diagnosis Date Noted   • Fatty liver 10/09/2019   • Hypovitaminosis D 10/09/2019   • Hypercholesterolemia 10/09/2019   • Leukopenia 10/09/2019   • Controlled substance agreement signed 10/09/2019   • Muscle spasm 10/09/2019   • Healthcare maintenance 10/09/2019   • Neck pain 09/07/2019   • Peripheral neuropathic pain 09/07/2019   • Fatigue 09/07/2019   • Dyslipidemia 09/07/2019   • Health care maintenance 09/07/2019     CURRENT MEDICATIONS  Current Outpatient Medications   Medication Sig Dispense Refill   • meclizine (ANTIVERT) 25 MG Tab  Take 1 Tab by mouth 3 times a day as needed. 30 Tab 0   • fluconazole (DIFLUCAN) 10 MG/ML Recon Susp Take 6 mg/kg by mouth every day.     • metroNIDAZOLE (FLAGYL) 5-0.79 MG/ML-% Solution 500 mg by Intravenous route every 8 hours.     • carisoprodol (SOMA) 350 MG Tab Take 350 mg by mouth 4 times a day.       No current facility-administered medications for this visit.      ALLERGIES  Allergies: Bee venom; Codeine; Iodine; Penicillins; Vancomycin; Aspirin; Ibuprofen; Latex; and Percocet [apap-fd&c red #40 [place]-oxycodone]  PAST MEDICAL HISTORY  Past Medical History:   Diagnosis Date   • Dyslipidemia    • Fatty liver    • Hypoglycemia    • Hypovitaminosis D    • Knee osteoarthritis    • Peripheral neuropathy      SURGICAL HISTORY  She  has a past surgical history that includes primary c section and ankle arthroscopy.  SOCIAL HISTORY  Social History     Tobacco Use   • Smoking status: Never Smoker   • Smokeless tobacco: Never Used   Substance Use Topics   • Alcohol use: Not on file   • Drug use: Not on file     Social History     Social History Narrative   • Not on file     FAMILY HISTORY  Family History   Problem Relation Age of Onset   • Diabetes Mother    • Hypertension Mother    • Hyperlipidemia Mother    • Heart Disease Father    • Hypertension Father    • Hyperlipidemia Father    • No Known Problems Sister    • No Known Problems Brother      Family Status   Relation Name Status   • Mo  Alive   • Fa  Alive   • Sis  Alive   • Bro  Alive       ROS   Constitutional: Negative for fever, chills. C/o fatigue.  HENT: Negative for congestion, sore throat.  Eyes: Negative for blurred vision.   Respiratory: Negative for cough, shortness of breath.  Cardiovascular: Negative for chest pain, palpitations.   Gastrointestinal: Negative for heartburn, nausea, abdominal pain.   Genitourinary: Negative for dysuria.  Musculoskeletal: as above.  Skin: Negative for rash and itching.   Neuro: Negative for dizziness, weakness and  "headaches.   Endo/Heme/Allergies: Does not bruise/bleed easily.   Psychiatric/Behavioral: Negative for depression, anxiety    PHYSICAL EXAM   Blood Pressure 124/72 (BP Location: Left arm, Patient Position: Sitting, BP Cuff Size: Adult)   Pulse 88   Temperature 36.2 °C (97.2 °F) (Temporal)   Respiration 12   Height 1.702 m (5' 7\")   Weight 81.4 kg (179 lb 7.3 oz)   Oxygen Saturation 97%   Body Mass Index 28.11 kg/m²   General:  NAD, well appearing  HEENT:   NC/AT, PERRLA, EOMI, TMs are clear. Oropharyngeal mucosa is pink,  without lesions;  no cervical / supraclavicular  lymphadenopathy, no thyromegaly.    Cardiovascular: RRR.   No m/r/g. No carotid bruits .      Lungs:   CTAB, no w/r/r, no respiratory distress.  Abdomen: Soft, NT/ND + BS; no suprapubic tenderness; no masses or hepatosplenomegaly.  Extremities:  2+ DP and radial pulses bilaterally.  No c/c/e.   Skin:  Warm, dry.  No erythema. No rash.   Neurologic: Alert & oriented x 3. CN II-XII grossly intact. Brachioradialis / knee DTR are 2/4, symmetric. Strength and sensation grossly intact.  No focal deficits.  Psychiatric:  Affect normal, mood normal, judgment normal.  MS: no TTP over spine/paraspinal muscles; muscle spasm.    LABS     Labs are reviewed and discussed with a patient  Lab Results   Component Value Date/Time    CHOLSTRLTOT 238 (H) 09/07/2019 09:09 AM     (H) 09/07/2019 09:09 AM    HDL 62 09/07/2019 09:09 AM    TRIGLYCERIDE 83 09/07/2019 09:09 AM       Lab Results   Component Value Date/Time    SODIUM 138 09/07/2019 09:09 AM    POTASSIUM 4.0 09/07/2019 09:09 AM    CHLORIDE 107 09/07/2019 09:09 AM    CO2 23 09/07/2019 09:09 AM    GLUCOSE 93 09/07/2019 09:09 AM    BUN 20 09/07/2019 09:09 AM    CREATININE 0.81 09/07/2019 09:09 AM     Lab Results   Component Value Date/Time    ALKPHOSPHAT 54 09/07/2019 09:09 AM    ASTSGOT 16 09/07/2019 09:09 AM    ALTSGPT 11 09/07/2019 09:09 AM    TBILIRUBIN 0.9 09/07/2019 09:09 AM      Lab Results "   Component Value Date/Time    WBC 3.8 (L) 09/07/2019 09:09 AM    RBC 4.75 09/07/2019 09:09 AM    HEMOGLOBIN 14.2 09/07/2019 09:09 AM    HEMATOCRIT 43.6 09/07/2019 09:09 AM    MCV 91.8 09/07/2019 09:09 AM    MCH 29.9 09/07/2019 09:09 AM    MCHC 32.6 (L) 09/07/2019 09:09 AM    MPV 9.6 09/07/2019 09:09 AM    NEUTSPOLYS 41.00 (L) 09/07/2019 09:09 AM    LYMPHOCYTES 45.90 (H) 09/07/2019 09:09 AM    MONOCYTES 8.50 09/07/2019 09:09 AM    EOSINOPHILS 3.50 09/07/2019 09:09 AM    BASOPHILS 0.80 09/07/2019 09:09 AM      IMAGING     None    ASSESMENT AND PLAN        1. Neck pain  Controlled, continue activity as tolerated, refill:  - oxyCODONE immediate-release (ROXICODONE) 5 MG Tab; Take 1 Tab by mouth every four hours as needed for Severe Pain for up to 30 days.  Dispense: 75 Tab; Refill: 0  2. Peripheral neuropathic pain  - oxyCODONE immediate-release (ROXICODONE) 5 MG Tab; Take 1 Tab by mouth every four hours as needed for Severe Pain for up to 30 days.  Dispense: 75 Tab; Refill: 0  3. Muscle spasm  - carisoprodol (SOMA) 350 MG Tab; Take 1 Tab by mouth 2 Times a Day for 30 days.  Dispense: 60 Tab; Refill: 0    4. Controlled substance agreement signed  - oxyCODONE immediate-release (ROXICODONE) 5 MG Tab; Take 1 Tab by mouth every four hours as needed for Severe Pain for up to 30 days.  Dispense: 75 Tab; Refill: 0    Obtained and reviewed patient utilization report from [facility] pharmacy database on 3/2/2020 1:04 PM  prior to writing prescription for controlled substance II, III or IV per [place] bill . Based on assessment of the report, the prescription is medically necessary.     5. Screening for malignant neoplasm of breast  - MA-SCREENING MAMMO BILAT W/TOMOSYNTHESIS W/CAD; Future    6. Fatigue, unspecified type  - EBV CHRONIC/ACTIVE INFECTION; Future    Counseling:   - Smoking:  Nonsmoker    Followup: in 3 months and prn    All questions are answered.    Please note that this dictation was created using voice  recognition software, and that there might be errors of marisa and possibly content.

## 2020-05-26 ENCOUNTER — HOSPITAL ENCOUNTER (OUTPATIENT)
Dept: LAB | Facility: MEDICAL CENTER | Age: 55
End: 2020-05-26
Attending: INTERNAL MEDICINE
Payer: COMMERCIAL

## 2020-05-26 ENCOUNTER — OFFICE VISIT (OUTPATIENT)
Dept: MEDICAL GROUP | Age: 55
End: 2020-05-26
Payer: COMMERCIAL

## 2020-05-26 VITALS
BODY MASS INDEX: 28.56 KG/M2 | SYSTOLIC BLOOD PRESSURE: 130 MMHG | HEART RATE: 95 BPM | HEIGHT: 67 IN | OXYGEN SATURATION: 97 % | DIASTOLIC BLOOD PRESSURE: 72 MMHG | TEMPERATURE: 97.6 F | WEIGHT: 182 LBS

## 2020-05-26 DIAGNOSIS — M62.838 MUSCLE SPASM: ICD-10-CM

## 2020-05-26 DIAGNOSIS — R53.83 FATIGUE, UNSPECIFIED TYPE: ICD-10-CM

## 2020-05-26 DIAGNOSIS — M79.2 PERIPHERAL NEUROPATHIC PAIN: ICD-10-CM

## 2020-05-26 DIAGNOSIS — E78.00 HYPERCHOLESTEROLEMIA: ICD-10-CM

## 2020-05-26 DIAGNOSIS — D72.819 LEUKOPENIA, UNSPECIFIED TYPE: ICD-10-CM

## 2020-05-26 DIAGNOSIS — M25.50 ARTHRALGIA, UNSPECIFIED JOINT: ICD-10-CM

## 2020-05-26 DIAGNOSIS — Z79.899 CONTROLLED SUBSTANCE AGREEMENT SIGNED: ICD-10-CM

## 2020-05-26 DIAGNOSIS — M54.2 NECK PAIN: ICD-10-CM

## 2020-05-26 DIAGNOSIS — E55.9 HYPOVITAMINOSIS D: ICD-10-CM

## 2020-05-26 LAB
ALBUMIN SERPL BCP-MCNC: 4.5 G/DL (ref 3.2–4.9)
ALBUMIN/GLOB SERPL: 1.7 G/DL
ALP SERPL-CCNC: 64 U/L (ref 30–99)
ALT SERPL-CCNC: 15 U/L (ref 2–50)
ANION GAP SERPL CALC-SCNC: 10 MMOL/L (ref 7–16)
AST SERPL-CCNC: 17 U/L (ref 12–45)
BASOPHILS # BLD AUTO: 0.8 % (ref 0–1.8)
BASOPHILS # BLD: 0.03 K/UL (ref 0–0.12)
BILIRUB SERPL-MCNC: 0.8 MG/DL (ref 0.1–1.5)
BUN SERPL-MCNC: 18 MG/DL (ref 8–22)
CALCIUM SERPL-MCNC: 9.2 MG/DL (ref 8.5–10.5)
CHLORIDE SERPL-SCNC: 104 MMOL/L (ref 96–112)
CHOLEST SERPL-MCNC: 253 MG/DL (ref 100–199)
CO2 SERPL-SCNC: 24 MMOL/L (ref 20–33)
CREAT SERPL-MCNC: 0.69 MG/DL (ref 0.5–1.4)
CRP SERPL HS-MCNC: 0.21 MG/DL (ref 0–0.75)
EOSINOPHIL # BLD AUTO: 0.1 K/UL (ref 0–0.51)
EOSINOPHIL NFR BLD: 2.6 % (ref 0–6.9)
ERYTHROCYTE [DISTWIDTH] IN BLOOD BY AUTOMATED COUNT: 45.3 FL (ref 35.9–50)
ERYTHROCYTE [SEDIMENTATION RATE] IN BLOOD BY WESTERGREN METHOD: <1 MM/HOUR (ref 0–30)
FASTING STATUS PATIENT QL REPORTED: NORMAL
GLOBULIN SER CALC-MCNC: 2.6 G/DL (ref 1.9–3.5)
GLUCOSE SERPL-MCNC: 87 MG/DL (ref 65–99)
HCT VFR BLD AUTO: 45.4 % (ref 37–47)
HDLC SERPL-MCNC: 65 MG/DL
HGB BLD-MCNC: 14.9 G/DL (ref 12–16)
IMM GRANULOCYTES # BLD AUTO: 0.01 K/UL (ref 0–0.11)
IMM GRANULOCYTES NFR BLD AUTO: 0.3 % (ref 0–0.9)
LDLC SERPL CALC-MCNC: 169 MG/DL
LYMPHOCYTES # BLD AUTO: 1.72 K/UL (ref 1–4.8)
LYMPHOCYTES NFR BLD: 44.3 % (ref 22–41)
MCH RBC QN AUTO: 30.4 PG (ref 27–33)
MCHC RBC AUTO-ENTMCNC: 32.8 G/DL (ref 33.6–35)
MCV RBC AUTO: 92.7 FL (ref 81.4–97.8)
MONOCYTES # BLD AUTO: 0.33 K/UL (ref 0–0.85)
MONOCYTES NFR BLD AUTO: 8.5 % (ref 0–13.4)
NEUTROPHILS # BLD AUTO: 1.69 K/UL (ref 2–7.15)
NEUTROPHILS NFR BLD: 43.5 % (ref 44–72)
NRBC # BLD AUTO: 0 K/UL
NRBC BLD-RTO: 0 /100 WBC
PLATELET # BLD AUTO: 222 K/UL (ref 164–446)
PMV BLD AUTO: 9.9 FL (ref 9–12.9)
POTASSIUM SERPL-SCNC: 4.3 MMOL/L (ref 3.6–5.5)
PROT SERPL-MCNC: 7.1 G/DL (ref 6–8.2)
QORDR QORDR: NORMAL
RBC # BLD AUTO: 4.9 M/UL (ref 4.2–5.4)
RHEUMATOID FACT SER IA-ACNC: <10 IU/ML (ref 0–14)
SODIUM SERPL-SCNC: 138 MMOL/L (ref 135–145)
TRIGL SERPL-MCNC: 97 MG/DL (ref 0–149)
TSH SERPL DL<=0.005 MIU/L-ACNC: 1.9 UIU/ML (ref 0.38–5.33)
WBC # BLD AUTO: 3.9 K/UL (ref 4.8–10.8)

## 2020-05-26 PROCEDURE — 82306 VITAMIN D 25 HYDROXY: CPT

## 2020-05-26 PROCEDURE — 86663 EPSTEIN-BARR ANTIBODY: CPT

## 2020-05-26 PROCEDURE — 85652 RBC SED RATE AUTOMATED: CPT

## 2020-05-26 PROCEDURE — 80053 COMPREHEN METABOLIC PANEL: CPT

## 2020-05-26 PROCEDURE — 99214 OFFICE O/P EST MOD 30 MIN: CPT | Performed by: INTERNAL MEDICINE

## 2020-05-26 PROCEDURE — 80061 LIPID PANEL: CPT

## 2020-05-26 PROCEDURE — 86140 C-REACTIVE PROTEIN: CPT

## 2020-05-26 PROCEDURE — 86665 EPSTEIN-BARR CAPSID VCA: CPT

## 2020-05-26 PROCEDURE — 36415 COLL VENOUS BLD VENIPUNCTURE: CPT

## 2020-05-26 PROCEDURE — 86200 CCP ANTIBODY: CPT

## 2020-05-26 PROCEDURE — 86664 EPSTEIN-BARR NUCLEAR ANTIGEN: CPT

## 2020-05-26 PROCEDURE — 84443 ASSAY THYROID STIM HORMONE: CPT

## 2020-05-26 PROCEDURE — 86431 RHEUMATOID FACTOR QUANT: CPT

## 2020-05-26 PROCEDURE — 85025 COMPLETE CBC W/AUTO DIFF WBC: CPT

## 2020-05-26 RX ORDER — OXYCODONE HYDROCHLORIDE 5 MG/1
5 TABLET ORAL EVERY 4 HOURS PRN
Qty: 60 TAB | Refills: 0 | Status: SHIPPED | OUTPATIENT
Start: 2020-08-26 | End: 2020-08-10 | Stop reason: SDUPTHER

## 2020-05-26 RX ORDER — OXYCODONE HYDROCHLORIDE AND ACETAMINOPHEN 5; 325 MG/1; MG/1
1-2 TABLET ORAL EVERY 4 HOURS PRN
Qty: 60 TAB | Refills: 0 | Status: SHIPPED
Start: 2020-07-26 | End: 2020-08-10

## 2020-05-26 RX ORDER — HYDROCHLOROTHIAZIDE 12.5 MG/1
12.5 CAPSULE, GELATIN COATED ORAL DAILY
Qty: 90 CAP | Refills: 0 | Status: SHIPPED | OUTPATIENT
Start: 2020-05-26 | End: 2020-08-10 | Stop reason: SDUPTHER

## 2020-05-26 RX ORDER — OXYCODONE HYDROCHLORIDE AND ACETAMINOPHEN 5; 325 MG/1; MG/1
1-2 TABLET ORAL EVERY 4 HOURS PRN
Qty: 60 TAB | Refills: 0 | Status: SHIPPED | OUTPATIENT
Start: 2020-05-26 | End: 2020-06-25

## 2020-05-26 RX ORDER — CARISOPRODOL 350 MG/1
350 TABLET ORAL 2 TIMES DAILY
Qty: 60 TAB | Refills: 0 | Status: SHIPPED | OUTPATIENT
Start: 2020-05-26 | End: 2020-08-10 | Stop reason: SDUPTHER

## 2020-05-26 ASSESSMENT — FIBROSIS 4 INDEX: FIB4 SCORE: 1.26

## 2020-05-26 NOTE — PROGRESS NOTES
Telemedicine Visit: Established Patient     This Remote Face to Face encounter was conducted via Zoom. Given the importance of social distancing and other strategies recommended to reduce the risk of COVID-19 transmission, I am providing medical care to this patient via audio/video visit in place of an in person visit at the request of the patient. Verbal consent to telehealth, risks, benefits, and consequences were discussed. Patient retains the right to withdraw at any time. All existing confidentiality protections apply. The patient has access to all transmitted medical information. No dissemination of any patient images or information to other entities without further written consent.    CHIEF COMPLAINT   Neck pain, pain med refill    HPI  Serena Rogers is a 54 y.o. female who presents today for the following     Neck pain, neuropathic pain, arthralgia, muscle spasm, fatigue;  LE swelling  Interval course  - Came for oxycodone refill   - c/o muscle, neck, joints (hands, knees), fatigue, LE swelling  - oxycodone, 3/25 mg BID prn, refill: 3/20/2020; 5/26/2020  -Controlled substance agreement:     9/6/2019  -Urine drug screen:                             9/6/2019  -Opioid risk tool, 0:                              10/9/2019     - Onset: remote  - Triger: multiple procedures  -Unknown etiology for neuropathic pain  - located in: lower back  - intensity: mild to moderate  - quality: dull and sharp   - radiation: no  - alleviating factors are: rest, pain medication occasionally, noted daily  -She weaned her off of all medication, except the above  - exacerbating factors are: activity  - accompanied:   - no numbness, weakness, tingling, fever, chills  - course: Improved but persistent  - imaging: Multiple  - treatment: as above     No reported history of:  - chronic immune suppression, alcoholism, IV drug abuse, indwelling catheter, diabetes.   - No history of recent spinal surgery or injection.     Denies:  -  numbness/saddle anesthesia.  - bowel/bladder changes, fever.   - trauma  - nausea/vomiting  - chest pain, shortness of breath, abdominal pain.    Reviewed PMH, PSH, FH, SH, ALL, HCM/IMM, no changes  Reviewed MEDS, no changes    Patient Active Problem List    Diagnosis Date Noted   • Fatty liver 10/09/2019   • Hypovitaminosis D 10/09/2019   • Hypercholesterolemia 10/09/2019   • Leukopenia 10/09/2019   • Controlled substance agreement signed 10/09/2019   • Muscle spasm 10/09/2019   • Healthcare maintenance 10/09/2019   • Neck pain 09/07/2019   • Peripheral neuropathic pain 09/07/2019   • Fatigue 09/07/2019   • Dyslipidemia 09/07/2019   • Health care maintenance 09/07/2019     CURRENT MEDICATIONS  Current Outpatient Medications   Medication Sig Dispense Refill   • carisoprodol (SOMA) 350 MG Tab Take 1 Tab by mouth 2 Times a Day for 30 days. 60 Tab 0   • [START ON 8/26/2020] oxyCODONE immediate-release (ROXICODONE) 5 MG Tab Take 1 Tab by mouth every four hours as needed for Severe Pain for up to 30 days. 60 Tab 0   • [START ON 7/26/2020] oxyCODONE-acetaminophen (PERCOCET) 5-325 MG Tab Take 1-2 Tabs by mouth every four hours as needed for up to 30 days. 60 Tab 0   • oxyCODONE-acetaminophen (PERCOCET) 5-325 MG Tab Take 1-2 Tabs by mouth every four hours as needed for up to 30 days. 60 Tab 0   • meclizine (ANTIVERT) 25 MG Tab Take 1 Tab by mouth 3 times a day as needed. 30 Tab 0   • fluconazole (DIFLUCAN) 10 MG/ML Recon Susp Take 6 mg/kg by mouth every day.     • metroNIDAZOLE (FLAGYL) 5-0.79 MG/ML-% Solution 500 mg by Intravenous route every 8 hours.     • carisoprodol (SOMA) 350 MG Tab Take 350 mg by mouth 4 times a day.       No current facility-administered medications for this visit.      ALLERGIES  Allergies: Bee venom; Codeine; Iodine; Penicillins; Vancomycin; Aspirin; Ibuprofen; Latex; and Percocet [apap-fd&c red #40 [place]-oxycodone]  PAST MEDICAL HISTORY  Past Medical History:   Diagnosis Date   • Dyslipidemia   "  • Fatty liver    • Hypoglycemia    • Hypovitaminosis D    • Knee osteoarthritis    • Peripheral neuropathy      SURGICAL HISTORY  She  has a past surgical history that includes primary c section and ankle arthroscopy.  SOCIAL HISTORY  Social History     Tobacco Use   • Smoking status: Never Smoker   • Smokeless tobacco: Never Used   Substance Use Topics   • Alcohol use: Not on file   • Drug use: Not on file     Social History     Social History Narrative   • Not on file     FAMILY HISTORY  Family History   Problem Relation Age of Onset   • Diabetes Mother    • Hypertension Mother    • Hyperlipidemia Mother    • Heart Disease Father    • Hypertension Father    • Hyperlipidemia Father    • No Known Problems Sister    • No Known Problems Brother      Family Status   Relation Name Status   • Mo  Alive   • Fa  Alive   • Sis  Alive   • Bro  Alive       ROS   Constitutional: Negative for fever, chills.  HENT: Negative for congestion, sore throat.  Eyes: Negative for vision problems.   Respiratory: Negative for cough, shortness of breath.  Cardiovascular: Negative for chest pain, palpitations.   Gastrointestinal: Negative for heartburn, nausea, abdominal pain.   Genitourinary: Negative for dysuria.  Musculoskeletal: Per HPI.   Skin: Negative for rash.   Neuro: Negative for dizziness, weakness.   Endo/Heme/Allergies: Does not bruise/bleed easily.   Psychiatric/Behavioral: Negative for depression.    Objective   Blood Pressure 130/72 (BP Location: Left arm, Patient Position: Sitting, BP Cuff Size: Adult)   Pulse 95   Temperature 36.4 °C (97.6 °F) (Temporal)   Height 1.702 m (5' 7\")   Weight 82.6 kg (182 lb)   Oxygen Saturation 97%   Body Mass Index 28.51 kg/m²   Physical Exam:  Constitutional: Alert, no distress, well-groomed.  Skin: No rash in visible areas.  Eye: Round. Conjunctiva clear, lids normal.  ENMT: Lips pink without lesions, good dentition. Phonation normal.  Neck: No visible masses or thyromegaly. Moves " freely without pain.  CV: no peripheral cyanosis, tachycardia.  Respiratory: Unlabored respiratory effort, no cough or audible wheezing.  Psych: Alert and oriented x3, normal affect and mood.     Labs     Labs are reviewed and discussed with a patient  Lab Results   Component Value Date/Time    CHOLSTRLTOT 238 (H) 09/07/2019 09:09 AM     (H) 09/07/2019 09:09 AM    HDL 62 09/07/2019 09:09 AM    TRIGLYCERIDE 83 09/07/2019 09:09 AM       Lab Results   Component Value Date/Time    SODIUM 138 09/07/2019 09:09 AM    POTASSIUM 4.0 09/07/2019 09:09 AM    CHLORIDE 107 09/07/2019 09:09 AM    CO2 23 09/07/2019 09:09 AM    GLUCOSE 93 09/07/2019 09:09 AM    BUN 20 09/07/2019 09:09 AM    CREATININE 0.81 09/07/2019 09:09 AM     Lab Results   Component Value Date/Time    ALKPHOSPHAT 54 09/07/2019 09:09 AM    ASTSGOT 16 09/07/2019 09:09 AM    ALTSGPT 11 09/07/2019 09:09 AM    TBILIRUBIN 0.9 09/07/2019 09:09 AM      No results found for: HBA1C  No results found for: TSH  No results found for: FREET4    Lab Results   Component Value Date/Time    WBC 3.8 (L) 09/07/2019 09:09 AM    RBC 4.75 09/07/2019 09:09 AM    HEMOGLOBIN 14.2 09/07/2019 09:09 AM    HEMATOCRIT 43.6 09/07/2019 09:09 AM    MCV 91.8 09/07/2019 09:09 AM    MCH 29.9 09/07/2019 09:09 AM    MCHC 32.6 (L) 09/07/2019 09:09 AM    MPV 9.6 09/07/2019 09:09 AM    NEUTSPOLYS 41.00 (L) 09/07/2019 09:09 AM    LYMPHOCYTES 45.90 (H) 09/07/2019 09:09 AM    MONOCYTES 8.50 09/07/2019 09:09 AM    EOSINOPHILS 3.50 09/07/2019 09:09 AM    BASOPHILS 0.80 09/07/2019 09:09 AM      Imaging      None    Assessment and Plan     Serena Rogers is a 54 y.o. female    1. Neck pain  Controlled, continue with current treatment, activity as tolerated    - oxyCODONE immediate-release (ROXICODONE) 5 MG Tab; Take 1 Tab by mouth every four hours as needed for Severe Pain for up to 30 days.  Dispense: 60 Tab; Refill: 0  - oxyCODONE-acetaminophen (PERCOCET) 5-325 MG Tab; Take 1-2 Tabs by mouth every  four hours as needed for up to 30 days.  Dispense: 60 Tab; Refill: 0  - oxyCODONE-acetaminophen (PERCOCET) 5-325 MG Tab; Take 1-2 Tabs by mouth every four hours as needed for up to 30 days.  Dispense: 60 Tab; Refill: 0  2. Peripheral neuropathic pain  - oxyCODONE immediate-release (ROXICODONE) 5 MG Tab; Take 1 Tab by mouth every four hours as needed for Severe Pain for up to 30 days.  Dispense: 60 Tab; Refill: 0  - oxyCODONE-acetaminophen (PERCOCET) 5-325 MG Tab; Take 1-2 Tabs by mouth every four hours as needed for up to 30 days.  Dispense: 60 Tab; Refill: 0  - oxyCODONE-acetaminophen (PERCOCET) 5-325 MG Tab; Take 1-2 Tabs by mouth every four hours as needed for up to 30 days.  Dispense: 60 Tab; Refill: 0  3. Arthralgia, unspecified joint  - Sed Rate; Future  - CRP QUANTITIVE (NON-CARDIAC); Future  - RHEUMATOID ARTHRITIS FACTOR; Future  - CCP ANTIBODY; Future    4. Muscle spasm  Controlled, continue current treatment  - carisoprodol (SOMA) 350 MG Tab; Take 1 Tab by mouth 2 Times a Day for 30 days.  Dispense: 60 Tab; Refill: 0    5. Fatigue, unspecified type  Pending labs  - TSH WITH REFLEX TO FT4; Future    6. Controlled substance agreement signed  - oxyCODONE immediate-release (ROXICODONE) 5 MG Tab; Take 1 Tab by mouth every four hours as needed for Severe Pain for up to 30 days.  Dispense: 60 Tab; Refill: 0    Obtained and reviewed patient utilization report from [facility] database on 5/26/2020 12:45 PM  prior to writing prescription for controlled substance II, III or IV per [place] bill . Based on assessment of the report, the prescription is medically necessary.     Follow-up: In 3 months and as needed

## 2020-05-27 LAB — 25(OH)D3 SERPL-MCNC: 25 NG/ML (ref 30–100)

## 2020-05-28 LAB
CCP IGG SERPL-ACNC: 6 UNITS (ref 0–19)
EBV EA-D IGG SER-ACNC: 5.9 U/ML (ref 0–10.9)
EBV NA IGG SER IA-ACNC: 430 U/ML (ref 0–21.9)
EBV VCA IGG SER IA-ACNC: 427 U/ML (ref 0–21.9)

## 2020-08-10 ENCOUNTER — TELEMEDICINE (OUTPATIENT)
Dept: MEDICAL GROUP | Age: 55
End: 2020-08-10
Payer: COMMERCIAL

## 2020-08-10 VITALS — HEIGHT: 67 IN | WEIGHT: 180.78 LBS | BODY MASS INDEX: 28.37 KG/M2 | TEMPERATURE: 97.5 F

## 2020-08-10 DIAGNOSIS — M79.2 PERIPHERAL NEUROPATHIC PAIN: ICD-10-CM

## 2020-08-10 DIAGNOSIS — Z79.891 CHRONIC USE OF OPIATE DRUG FOR THERAPEUTIC PURPOSE: ICD-10-CM

## 2020-08-10 DIAGNOSIS — Z79.899 CONTROLLED SUBSTANCE AGREEMENT SIGNED: ICD-10-CM

## 2020-08-10 DIAGNOSIS — M54.2 NECK PAIN: ICD-10-CM

## 2020-08-10 DIAGNOSIS — Z12.31 ENCOUNTER FOR SCREENING MAMMOGRAM FOR MALIGNANT NEOPLASM OF BREAST: ICD-10-CM

## 2020-08-10 DIAGNOSIS — M62.838 MUSCLE SPASM: ICD-10-CM

## 2020-08-10 DIAGNOSIS — F11.20 UNCOMPLICATED OPIOID DEPENDENCE (HCC): ICD-10-CM

## 2020-08-10 DIAGNOSIS — M25.50 ARTHRALGIA, UNSPECIFIED JOINT: ICD-10-CM

## 2020-08-10 PROCEDURE — 99214 OFFICE O/P EST MOD 30 MIN: CPT | Mod: 95,CR | Performed by: INTERNAL MEDICINE

## 2020-08-10 RX ORDER — OXYCODONE HYDROCHLORIDE 5 MG/1
5 TABLET ORAL EVERY 4 HOURS PRN
Qty: 60 TAB | Refills: 0 | Status: SHIPPED | OUTPATIENT
Start: 2020-08-26 | End: 2020-12-22 | Stop reason: SDUPTHER

## 2020-08-10 RX ORDER — HYDROCHLOROTHIAZIDE 12.5 MG/1
12.5 CAPSULE, GELATIN COATED ORAL DAILY
Qty: 90 CAP | Refills: 0 | Status: SHIPPED | OUTPATIENT
Start: 2020-08-10 | End: 2021-04-29 | Stop reason: SDUPTHER

## 2020-08-10 RX ORDER — OXYCODONE HYDROCHLORIDE 5 MG/1
5 TABLET ORAL EVERY 4 HOURS PRN
Qty: 60 TAB | Refills: 0 | Status: SHIPPED | OUTPATIENT
Start: 2020-09-07 | End: 2020-12-22 | Stop reason: SDUPTHER

## 2020-08-10 RX ORDER — TRAZODONE HYDROCHLORIDE 50 MG/1
50 TABLET ORAL NIGHTLY PRN
Qty: 30 TAB | Refills: 3 | Status: SHIPPED | OUTPATIENT
Start: 2020-08-10 | End: 2021-04-29 | Stop reason: SDUPTHER

## 2020-08-10 RX ORDER — OXYCODONE HYDROCHLORIDE 5 MG/1
5 TABLET ORAL EVERY 4 HOURS PRN
Qty: 60 TAB | Refills: 0 | Status: SHIPPED | OUTPATIENT
Start: 2020-10-05 | End: 2020-12-22 | Stop reason: SDUPTHER

## 2020-08-10 RX ORDER — OXYCODONE HYDROCHLORIDE AND ACETAMINOPHEN 5; 325 MG/1; MG/1
1-2 TABLET ORAL EVERY 4 HOURS PRN
Qty: 60 TAB | Refills: 0 | Status: CANCELLED | OUTPATIENT
Start: 2020-08-10 | End: 2020-09-09

## 2020-08-10 RX ORDER — OXYCODONE HYDROCHLORIDE 5 MG/1
5 TABLET ORAL EVERY 4 HOURS PRN
Qty: 60 TAB | Refills: 0 | Status: SHIPPED | OUTPATIENT
Start: 2020-10-05 | End: 2020-08-10 | Stop reason: SDUPTHER

## 2020-08-10 RX ORDER — CARISOPRODOL 350 MG/1
350 TABLET ORAL 2 TIMES DAILY
Qty: 60 TAB | Refills: 2 | Status: SHIPPED | OUTPATIENT
Start: 2020-08-10 | End: 2020-12-22 | Stop reason: SDUPTHER

## 2020-08-10 RX ORDER — OXYCODONE HYDROCHLORIDE AND ACETAMINOPHEN 5; 325 MG/1; MG/1
1-2 TABLET ORAL EVERY 4 HOURS PRN
Qty: 60 TAB | Refills: 0 | Status: CANCELLED | OUTPATIENT
Start: 2020-09-07 | End: 2020-10-07

## 2020-08-10 ASSESSMENT — FIBROSIS 4 INDEX: FIB4 SCORE: 1.11

## 2020-12-22 ENCOUNTER — HOSPITAL ENCOUNTER (OUTPATIENT)
Facility: MEDICAL CENTER | Age: 55
End: 2020-12-22
Attending: INTERNAL MEDICINE
Payer: COMMERCIAL

## 2020-12-22 ENCOUNTER — OFFICE VISIT (OUTPATIENT)
Dept: MEDICAL GROUP | Age: 55
End: 2020-12-22
Payer: COMMERCIAL

## 2020-12-22 VITALS
RESPIRATION RATE: 14 BRPM | WEIGHT: 184 LBS | DIASTOLIC BLOOD PRESSURE: 90 MMHG | HEIGHT: 67 IN | SYSTOLIC BLOOD PRESSURE: 140 MMHG | TEMPERATURE: 97.9 F | BODY MASS INDEX: 28.88 KG/M2 | HEART RATE: 90 BPM | OXYGEN SATURATION: 97 %

## 2020-12-22 DIAGNOSIS — Z79.899 CONTROLLED SUBSTANCE AGREEMENT SIGNED: ICD-10-CM

## 2020-12-22 DIAGNOSIS — M25.50 ARTHRALGIA, UNSPECIFIED JOINT: ICD-10-CM

## 2020-12-22 DIAGNOSIS — Z79.891 CHRONIC USE OF OPIATE DRUG FOR THERAPEUTIC PURPOSE: ICD-10-CM

## 2020-12-22 DIAGNOSIS — M79.2 PERIPHERAL NEUROPATHIC PAIN: ICD-10-CM

## 2020-12-22 DIAGNOSIS — F11.20 UNCOMPLICATED OPIOID DEPENDENCE (HCC): ICD-10-CM

## 2020-12-22 DIAGNOSIS — K21.9 GASTROESOPHAGEAL REFLUX DISEASE, UNSPECIFIED WHETHER ESOPHAGITIS PRESENT: ICD-10-CM

## 2020-12-22 DIAGNOSIS — Z12.31 ENCOUNTER FOR SCREENING MAMMOGRAM FOR MALIGNANT NEOPLASM OF BREAST: ICD-10-CM

## 2020-12-22 DIAGNOSIS — M62.838 MUSCLE SPASM: ICD-10-CM

## 2020-12-22 DIAGNOSIS — G89.29 CHRONIC PAIN OF LEFT KNEE: ICD-10-CM

## 2020-12-22 DIAGNOSIS — R06.02 SOB (SHORTNESS OF BREATH): ICD-10-CM

## 2020-12-22 DIAGNOSIS — M54.2 NECK PAIN: ICD-10-CM

## 2020-12-22 DIAGNOSIS — M25.562 CHRONIC PAIN OF LEFT KNEE: ICD-10-CM

## 2020-12-22 PROCEDURE — 80307 DRUG TEST PRSMV CHEM ANLYZR: CPT

## 2020-12-22 PROCEDURE — 99214 OFFICE O/P EST MOD 30 MIN: CPT | Performed by: INTERNAL MEDICINE

## 2020-12-22 RX ORDER — OXYCODONE HYDROCHLORIDE 5 MG/1
5 TABLET ORAL EVERY 4 HOURS PRN
Qty: 60 TAB | Refills: 0 | Status: SHIPPED | OUTPATIENT
Start: 2020-12-22 | End: 2021-05-10 | Stop reason: SDUPTHER

## 2020-12-22 RX ORDER — CARISOPRODOL 350 MG/1
350 TABLET ORAL 2 TIMES DAILY
Qty: 60 TAB | Refills: 2 | Status: SHIPPED | OUTPATIENT
Start: 2020-12-22 | End: 2021-01-21

## 2020-12-22 RX ORDER — OXYCODONE HYDROCHLORIDE 5 MG/1
5 TABLET ORAL EVERY 4 HOURS PRN
Qty: 60 TAB | Refills: 0 | Status: SHIPPED | OUTPATIENT
Start: 2021-02-15 | End: 2021-05-10 | Stop reason: SDUPTHER

## 2020-12-22 RX ORDER — OXYCODONE HYDROCHLORIDE 5 MG/1
5 TABLET ORAL EVERY 4 HOURS PRN
Qty: 60 TAB | Refills: 0 | Status: SHIPPED | OUTPATIENT
Start: 2021-01-18 | End: 2021-05-10 | Stop reason: SDUPTHER

## 2020-12-22 RX ORDER — OMEPRAZOLE 20 MG/1
20 CAPSULE, DELAYED RELEASE ORAL DAILY
Qty: 90 CAP | Refills: 0 | Status: SHIPPED | OUTPATIENT
Start: 2020-12-22 | End: 2021-04-29 | Stop reason: SDUPTHER

## 2020-12-22 ASSESSMENT — FIBROSIS 4 INDEX: FIB4 SCORE: 1.11

## 2020-12-22 NOTE — PROGRESS NOTES
CHIEF COMPLAINT  Chief Complaint   Patient presents with   • Follow-Up     pain management     HPI  Serena Rogers is a 55 y.o. female who presents today for the following     Neck pain, neuropathic pain, arthralgia/knee pain, muscle spasm  LE swelling  - Stable with oxycodone w/o tylenol              - c/o muscle, neck, joints (hands, knees), fatigue, LE swelling  - oxycodone, 3/25 mg BID prn, refill: 5/26/2020, 8/10/2020  -Controlled substance agreement:     12/22/2020  -Urine drug screen:                             12/22/2020  -Opioid risk tool, 0:                              10/9/2019     - Onset: remote  - Triger: multiple procedures  -Unknown etiology for neuropathic pain  - located in: lower back  - intensity: mild to moderate  - quality: dull and sharp   - radiation: no  - alleviating factors are: rest, pain medication occasionally, noted daily  -She weaned her off of all medication, except the above  - exacerbating factors are: activity  - accompanied:   - no numbness, weakness, tingling, fever, chills  - course: Improved but persistent  - imaging: Multiple  - treatment: as above     No reported history of:  - chronic immune suppression, alcoholism, IV drug abuse, indwelling catheter, diabetes.   - No history of recent spinal surgery or injection.     Denies:  - numbness/saddle anesthesia.  - bowel/bladder changes, fever.   - trauma  - nausea/vomiting  - chest pain, shortness of breath, abdominal pain.     SOB  The patient has h/o asthma.  C/o increase SOB in the few yrs.  Denies palpitations, irregular heart beats.  No recent CXR.    GERD  C/o heartburn, no meds.   Denies:   - heartburn, epigastric pain.   -  nausea, vomiting, or diarrhea  - dysphagia  - abnormal cough  - blood in the stool or dark tarry stools.  - early satiety  - tobacco use.          Reviewed PMH, PSH, FH, SH, ALL, HCM/IMM, no changes  Reviewed MEDS, no changes    Patient Active Problem List    Diagnosis Date Noted   • Chronic use  of opiate drug for therapeutic purpose 08/10/2020   • Uncomplicated opioid dependence (HCC) 08/10/2020   • Encounter for screening mammogram for malignant neoplasm of breast 08/10/2020   • Arthralgia 05/26/2020   • Fatty liver 10/09/2019   • Hypovitaminosis D 10/09/2019   • Hypercholesterolemia 10/09/2019   • Leukopenia 10/09/2019   • Controlled substance agreement signed 10/09/2019   • Muscle spasm 10/09/2019   • Healthcare maintenance 10/09/2019   • Neck pain 09/07/2019   • Peripheral neuropathic pain 09/07/2019   • Fatigue 09/07/2019   • Dyslipidemia 09/07/2019   • Health care maintenance 09/07/2019     CURRENT MEDICATIONS  Current Outpatient Medications   Medication Sig Dispense Refill   • hydrochlorothiazide (MICROZIDE) 12.5 MG capsule Take 1 Cap by mouth every day. 90 Cap 0   • traZODone (DESYREL) 50 MG Tab Take 1 Tab by mouth at bedtime as needed for Sleep. 30 Tab 3   • meclizine (ANTIVERT) 25 MG Tab Take 1 Tab by mouth 3 times a day as needed. 30 Tab 0   • fluconazole (DIFLUCAN) 10 MG/ML Recon Susp Take 6 mg/kg by mouth every day.     • metroNIDAZOLE (FLAGYL) 5-0.79 MG/ML-% Solution 500 mg by Intravenous route every 8 hours.     • carisoprodol (SOMA) 350 MG Tab Take 350 mg by mouth 4 times a day.       No current facility-administered medications for this visit.      ALLERGIES  Allergies: Bee venom, Codeine, Iodine, Penicillins, Vancomycin, Aspirin, Ibuprofen, Latex, and Percocet [apap-fd&c red #40 [place]-oxycodone]  PAST MEDICAL HISTORY  Past Medical History:   Diagnosis Date   • Dyslipidemia    • Fatty liver    • Hypoglycemia    • Hypovitaminosis D    • Knee osteoarthritis    • Peripheral neuropathy      SURGICAL HISTORY  She  has a past surgical history that includes primary c section and ankle arthroscopy.  SOCIAL HISTORY  Social History     Tobacco Use   • Smoking status: Never Smoker   • Smokeless tobacco: Never Used   Substance Use Topics   • Alcohol use: Not on file   • Drug use: Not on file  "    Social History     Social History Narrative   • Not on file     FAMILY HISTORY  Family History   Problem Relation Age of Onset   • Diabetes Mother    • Hypertension Mother    • Hyperlipidemia Mother    • Heart Disease Father    • Hypertension Father    • Hyperlipidemia Father    • No Known Problems Sister    • No Known Problems Brother      Family Status   Relation Name Status   • Mo  Alive   • Fa  Alive   • Sis  Alive   • Bro  Alive       ROS   Constitutional: Negative for fever, chills, fatigue.  HENT: Negative for congestion, sore throat.  Eyes: Negative for vision problems.   Respiratory: Negative for cough, shortness of breath.  Cardiovascular: Negative for chest pain, palpitations.   Gastrointestinal: Negative for abdominal pain.   Genitourinary: Negative for dysuria.  Musculoskeletal: as above.   Skin: Negative for rash.   Neuro: Negative for dizziness, weakness and headaches.   Endo/Heme/Allergies: Does not bruise/bleed easily.   Psychiatric/Behavioral: Negative for depression.    PHYSICAL EXAM   Blood Pressure 140/90 (BP Location: Right arm, Patient Position: Sitting, BP Cuff Size: Adult)   Pulse 90   Temperature 36.6 °C (97.9 °F) (Temporal)   Respiration 14   Height 1.702 m (5' 7\")   Weight 83.5 kg (184 lb)   Oxygen Saturation 97%  Body mass index is 28.82 kg/m².  General:  NAD, well appearing  HEENT:   NC/AT, PERRLA, EOMI.  Cardiovascular: unlabored breathing, no peripheral cyanosis or swelling.     Lungs:   no respiratory distress.  Abdomen: non- distended.  Extremities:  No LE swelling.  Skin:  Warm, dry.  No erythema. No rash.   Neurologic: Alert & oriented x 3. CN II-XII grossly intact. No focal deficits.  Psychiatric:  Affect normal, mood normal, judgment normal.    Labs     Labs are reviewed and discussed with a patient  Lab Results   Component Value Date/Time    CHOLSTRLTOT 253 (H) 05/26/2020 10:36 AM     (H) 05/26/2020 10:36 AM    HDL 65 05/26/2020 10:36 AM    TRIGLYCERIDE 97 " 05/26/2020 10:36 AM       Lab Results   Component Value Date/Time    SODIUM 138 05/26/2020 10:36 AM    POTASSIUM 4.3 05/26/2020 10:36 AM    CHLORIDE 104 05/26/2020 10:36 AM    CO2 24 05/26/2020 10:36 AM    GLUCOSE 87 05/26/2020 10:36 AM    BUN 18 05/26/2020 10:36 AM    CREATININE 0.69 05/26/2020 10:36 AM     Lab Results   Component Value Date/Time    ALKPHOSPHAT 64 05/26/2020 10:36 AM    ASTSGOT 17 05/26/2020 10:36 AM    ALTSGPT 15 05/26/2020 10:36 AM    TBILIRUBIN 0.8 05/26/2020 10:36 AM      No results found for: HBA1C  No results found for: TSH  No results found for: FREET4    Lab Results   Component Value Date/Time    WBC 3.9 (L) 05/26/2020 10:36 AM    RBC 4.90 05/26/2020 10:36 AM    HEMOGLOBIN 14.9 05/26/2020 10:36 AM    HEMATOCRIT 45.4 05/26/2020 10:36 AM    MCV 92.7 05/26/2020 10:36 AM    MCH 30.4 05/26/2020 10:36 AM    MCHC 32.8 (L) 05/26/2020 10:36 AM    MPV 9.9 05/26/2020 10:36 AM    NEUTSPOLYS 43.50 (L) 05/26/2020 10:36 AM    LYMPHOCYTES 44.30 (H) 05/26/2020 10:36 AM    MONOCYTES 8.50 05/26/2020 10:36 AM    EOSINOPHILS 2.60 05/26/2020 10:36 AM    BASOPHILS 0.80 05/26/2020 10:36 AM      Imaging     Pending    Assessment and Plan     Serena Rogers is a 55 y.o. female    1. Neck pain  Controlled, continue with current treatment.  - URINE DRUG SCREEN; Future  - Controlled Substance Treatment Agreement  - oxyCODONE immediate-release (ROXICODONE) 5 MG Tab; Take 1 Tab by mouth every four hours as needed for Severe Pain for up to 30 days.  Dispense: 60 Tab; Refill: 0  - oxyCODONE immediate-release (ROXICODONE) 5 MG Tab; Take 1 Tab by mouth every four hours as needed for Severe Pain for up to 30 days.  Dispense: 60 Tab; Refill: 0  - oxyCODONE immediate-release (ROXICODONE) 5 MG Tab; Take 1 Tab by mouth every four hours as needed for Severe Pain for up to 30 days.  Dispense: 60 Tab; Refill: 0  - DX-CERVICAL SPINE-2 OR 3 VIEWS; Future  - REFERRAL TO PHYSICAL THERAPY  2. Peripheral neuropathic pain  - URINE DRUG  SCREEN; Future  - Controlled Substance Treatment Agreement  - oxyCODONE immediate-release (ROXICODONE) 5 MG Tab; Take 1 Tab by mouth every four hours as needed for Severe Pain for up to 30 days.  Dispense: 60 Tab; Refill: 0  - oxyCODONE immediate-release (ROXICODONE) 5 MG Tab; Take 1 Tab by mouth every four hours as needed for Severe Pain for up to 30 days.  Dispense: 60 Tab; Refill: 0  - oxyCODONE immediate-release (ROXICODONE) 5 MG Tab; Take 1 Tab by mouth every four hours as needed for Severe Pain for up to 30 days.  Dispense: 60 Tab; Refill: 0  3. Arthralgia, unspecified joint  - URINE DRUG SCREEN; Future  - Controlled Substance Treatment Agreement  - oxyCODONE immediate-release (ROXICODONE) 5 MG Tab; Take 1 Tab by mouth every four hours as needed for Severe Pain for up to 30 days.  Dispense: 60 Tab; Refill: 0  - oxyCODONE immediate-release (ROXICODONE) 5 MG Tab; Take 1 Tab by mouth every four hours as needed for Severe Pain for up to 30 days.  Dispense: 60 Tab; Refill: 0  - oxyCODONE immediate-release (ROXICODONE) 5 MG Tab; Take 1 Tab by mouth every four hours as needed for Severe Pain for up to 30 days.  Dispense: 60 Tab; Refill: 0  4. Chronic pain of left knee  - REFERRAL TO ORTHOPEDICS  5. Muscle spasm  - carisoprodol (SOMA) 350 MG Tab; Take 1 Tab by mouth 2 Times a Day for 30 days.  Dispense: 60 Tab; Refill: 2  - DX-CERVICAL SPINE-2 OR 3 VIEWS; Future  - REFERRAL TO PHYSICAL THERAPY    6. Controlled substance agreement signed  - URINE DRUG SCREEN; Future  - Controlled Substance Treatment Agreement  - oxyCODONE immediate-release (ROXICODONE) 5 MG Tab; Take 1 Tab by mouth every four hours as needed for Severe Pain for up to 30 days.  Dispense: 60 Tab; Refill: 0  - oxyCODONE immediate-release (ROXICODONE) 5 MG Tab; Take 1 Tab by mouth every four hours as needed for Severe Pain for up to 30 days.  Dispense: 60 Tab; Refill: 0  - oxyCODONE immediate-release (ROXICODONE) 5 MG Tab; Take 1 Tab by mouth every four  hours as needed for Severe Pain for up to 30 days.  Dispense: 60 Tab; Refill: 0  - carisoprodol (SOMA) 350 MG Tab; Take 1 Tab by mouth 2 Times a Day for 30 days.  Dispense: 60 Tab; Refill: 2  7. Chronic use of opiate drug for therapeutic purpose  - URINE DRUG SCREEN; Future  - Controlled Substance Treatment Agreement  - oxyCODONE immediate-release (ROXICODONE) 5 MG Tab; Take 1 Tab by mouth every four hours as needed for Severe Pain for up to 30 days.  Dispense: 60 Tab; Refill: 0  - oxyCODONE immediate-release (ROXICODONE) 5 MG Tab; Take 1 Tab by mouth every four hours as needed for Severe Pain for up to 30 days.  Dispense: 60 Tab; Refill: 0  - oxyCODONE immediate-release (ROXICODONE) 5 MG Tab; Take 1 Tab by mouth every four hours as needed for Severe Pain for up to 30 days.  Dispense: 60 Tab; Refill: 0  - carisoprodol (SOMA) 350 MG Tab; Take 1 Tab by mouth 2 Times a Day for 30 days.  Dispense: 60 Tab; Refill: 2  8. Uncomplicated opioid dependence (HCC)  - URINE DRUG SCREEN; Future  - Controlled Substance Treatment Agreement  - oxyCODONE immediate-release (ROXICODONE) 5 MG Tab; Take 1 Tab by mouth every four hours as needed for Severe Pain for up to 30 days.  Dispense: 60 Tab; Refill: 0  - oxyCODONE immediate-release (ROXICODONE) 5 MG Tab; Take 1 Tab by mouth every four hours as needed for Severe Pain for up to 30 days.  Dispense: 60 Tab; Refill: 0  - oxyCODONE immediate-release (ROXICODONE) 5 MG Tab; Take 1 Tab by mouth every four hours as needed for Severe Pain for up to 30 days.  Dispense: 60 Tab; Refill: 0  - carisoprodol (SOMA) 350 MG Tab; Take 1 Tab by mouth 2 Times a Day for 30 days.  Dispense: 60 Tab; Refill: 2    Obtained and reviewed patient utilization report from [facility] pharmacy database on 12/22/2020 1:11 PM  prior to writing prescription for controlled substance II, III or IV per [place] bill . Based on assessment of the report, the prescription is medically necessary.     9. SOB (shortness of  breath)  She will schedule OV for EKG  - DX-CHEST-2 VIEWS; Future  - PULMONARY FUNCTION TESTS -Test requested: Spirometry with-out & with Bronchodilator; Future    10. Gastroesophageal reflux disease, unspecified whether esophagitis present  Start:  - omeprazole (PRILOSEC) 20 MG delayed-release capsule; Take 1 Cap by mouth every day.  Dispense: 90 Cap; Refill: 0    11. Encounter for screening mammogram for malignant neoplasm of breast  - MA-SCREENING MAMMO BILAT W/TOMOSYNTHESIS W/CAD; Future    Counseling:   - Smoking:  Nonsmoker    Followup: in 3 months and prn    All questions are answered.    Please note that this dictation was created using voice recognition software, and that there might be errors of marisa and possibly content.

## 2020-12-24 LAB
AMPHETAMINES UR QL: NEGATIVE NG/ML
BARBITURATES UR QL: NEGATIVE NG/ML
BENZODIAZ UR QL: NEGATIVE NG/ML
CANNABINOIDS UR QL SCN: NEGATIVE NG/ML
COCAINE UR QL: NEGATIVE NG/ML
DRUG SCREEN COMMENT UR-IMP: NORMAL
MDMA CTO UR SCN-MCNC: NEGATIVE NG/ML
METHADONE UR QL: NEGATIVE NG/ML
OPIATES UR QL: NEGATIVE NG/ML
OXYCODONE CTO UR SCN-MCNC: POSITIVE NG/ML
PCP UR QL SCN: NEGATIVE NG/ML
PROPOXYPH UR QL: NEGATIVE NG/ML

## 2021-03-06 ENCOUNTER — HOSPITAL ENCOUNTER (OUTPATIENT)
Dept: RADIOLOGY | Facility: MEDICAL CENTER | Age: 56
End: 2021-03-06
Attending: INTERNAL MEDICINE
Payer: COMMERCIAL

## 2021-03-06 DIAGNOSIS — R06.02 SOB (SHORTNESS OF BREATH): ICD-10-CM

## 2021-03-06 PROCEDURE — 71046 X-RAY EXAM CHEST 2 VIEWS: CPT

## 2021-03-15 DIAGNOSIS — Z23 NEED FOR VACCINATION: ICD-10-CM

## 2021-04-28 ENCOUNTER — PATIENT MESSAGE (OUTPATIENT)
Dept: MEDICAL GROUP | Age: 56
End: 2021-04-28

## 2021-04-28 DIAGNOSIS — K21.9 GASTROESOPHAGEAL REFLUX DISEASE, UNSPECIFIED WHETHER ESOPHAGITIS PRESENT: ICD-10-CM

## 2021-04-28 DIAGNOSIS — M62.838 MUSCLE SPASM: ICD-10-CM

## 2021-04-29 RX ORDER — OMEPRAZOLE 20 MG/1
20 CAPSULE, DELAYED RELEASE ORAL DAILY
Qty: 90 CAPSULE | Refills: 0 | Status: SHIPPED | OUTPATIENT
Start: 2021-04-29

## 2021-04-29 RX ORDER — FLUCONAZOLE 10 MG/ML
POWDER, FOR SUSPENSION ORAL
Qty: 300 ML | Refills: 0 | Status: SHIPPED | OUTPATIENT
Start: 2021-04-29

## 2021-04-29 RX ORDER — TRAZODONE HYDROCHLORIDE 50 MG/1
50 TABLET ORAL NIGHTLY PRN
Qty: 90 TABLET | Refills: 0 | Status: SHIPPED | OUTPATIENT
Start: 2021-04-29

## 2021-04-29 RX ORDER — CARISOPRODOL 350 MG/1
350 TABLET ORAL 4 TIMES DAILY
Qty: 120 TABLET | Refills: 0 | Status: SHIPPED | OUTPATIENT
Start: 2021-04-29 | End: 2021-08-27

## 2021-04-29 RX ORDER — HYDROCHLOROTHIAZIDE 12.5 MG/1
12.5 CAPSULE, GELATIN COATED ORAL DAILY
Qty: 90 CAPSULE | Refills: 0 | Status: SHIPPED | OUTPATIENT
Start: 2021-04-29

## 2021-04-29 RX ORDER — MECLIZINE HYDROCHLORIDE 25 MG/1
25 TABLET ORAL 3 TIMES DAILY PRN
Qty: 90 TABLET | Refills: 0 | Status: SHIPPED | OUTPATIENT
Start: 2021-04-29

## 2021-04-29 NOTE — PATIENT COMMUNICATION
Received request via: Patient    Was the patient seen in the last year in this department? Yes    Does the patient have an active prescription (recently filled or refills available) for medication(s) requested? No   PATIENTS INSURANCE NO LONGER COVERED. NEEDS MEDS TO FIND NEW PCP.

## 2021-05-10 ENCOUNTER — TELEMEDICINE (OUTPATIENT)
Dept: MEDICAL GROUP | Age: 56
End: 2021-05-10
Payer: COMMERCIAL

## 2021-05-10 VITALS — BODY MASS INDEX: 29.03 KG/M2 | WEIGHT: 185 LBS | TEMPERATURE: 97.2 F | HEIGHT: 67 IN

## 2021-05-10 DIAGNOSIS — Z79.899 CONTROLLED SUBSTANCE AGREEMENT SIGNED: ICD-10-CM

## 2021-05-10 DIAGNOSIS — M54.2 NECK PAIN: ICD-10-CM

## 2021-05-10 DIAGNOSIS — M79.2 PERIPHERAL NEUROPATHIC PAIN: ICD-10-CM

## 2021-05-10 DIAGNOSIS — Z12.31 ENCOUNTER FOR SCREENING MAMMOGRAM FOR MALIGNANT NEOPLASM OF BREAST: ICD-10-CM

## 2021-05-10 DIAGNOSIS — M25.50 ARTHRALGIA, UNSPECIFIED JOINT: ICD-10-CM

## 2021-05-10 DIAGNOSIS — Z79.891 CHRONIC USE OF OPIATE DRUG FOR THERAPEUTIC PURPOSE: ICD-10-CM

## 2021-05-10 DIAGNOSIS — F11.20 UNCOMPLICATED OPIOID DEPENDENCE (HCC): ICD-10-CM

## 2021-05-10 DIAGNOSIS — Z79.891 CHRONIC PRESCRIPTION OPIATE USE: ICD-10-CM

## 2021-05-10 DIAGNOSIS — M62.838 MUSCLE SPASM: ICD-10-CM

## 2021-05-10 PROCEDURE — 99214 OFFICE O/P EST MOD 30 MIN: CPT | Mod: 95 | Performed by: INTERNAL MEDICINE

## 2021-05-10 RX ORDER — CLINDAMYCIN HYDROCHLORIDE 300 MG/1
300 CAPSULE ORAL 3 TIMES DAILY
Qty: 30 CAPSULE | Refills: 0 | Status: SHIPPED | OUTPATIENT
Start: 2021-05-10

## 2021-05-10 RX ORDER — OXYCODONE HYDROCHLORIDE 5 MG/1
5 TABLET ORAL EVERY 4 HOURS PRN
Qty: 60 TABLET | Refills: 0 | Status: SHIPPED | OUTPATIENT
Start: 2021-05-10 | End: 2021-06-09

## 2021-05-10 RX ORDER — OXYCODONE HYDROCHLORIDE 5 MG/1
5 TABLET ORAL EVERY 4 HOURS PRN
Qty: 60 TABLET | Refills: 0 | Status: SHIPPED | OUTPATIENT
Start: 2021-06-08 | End: 2021-07-08

## 2021-05-10 RX ORDER — OXYCODONE HYDROCHLORIDE 5 MG/1
5 TABLET ORAL EVERY 4 HOURS PRN
Qty: 60 TABLET | Refills: 0 | Status: SHIPPED | OUTPATIENT
Start: 2021-07-06 | End: 2021-08-05

## 2021-05-10 ASSESSMENT — FIBROSIS 4 INDEX: FIB4 SCORE: 1.11

## 2021-05-10 ASSESSMENT — PATIENT HEALTH QUESTIONNAIRE - PHQ9: CLINICAL INTERPRETATION OF PHQ2 SCORE: 0

## 2021-05-10 NOTE — PROGRESS NOTES
Telemedicine Visit: Established Patient     This Remote Face to Face encounter was conducted via Zoom. Given the importance of social distancing and other strategies recommended to reduce the risk of COVID-19 transmission, I am providing medical care to this patient via audio/video visit in place of an in person visit at the request of the patient. Verbal consent to telehealth, risks, benefits, and consequences were discussed. Patient retains the right to withdraw at any time. All existing confidentiality protections apply. The patient has access to all transmitted medical information. No dissemination of any patient images or information to other entities without further written consent.    CHIEF COMPLAINT     Chief Complaint   Patient presents with   • Medication Refill     oxycodone   • Otalgia     infection,x 3 days     HPI  Serena Rogers is a 55 y.o. female who presents today for the following     Neck pain, neuropathic pain, arthralgia/knee pain, muscle spasm  - Stable with oxycodone w/o tylenol              - c/o muscle, neck, joints (hands, knees), fatigue, LE swelling  - oxycodone, 3/25 mg BID prn, refill:  12/22/2020; 5/10/2021  -Controlled substance agreement:     12/22/2020  -Urine drug screen:                             12/22/2020  -Opioid risk tool, 0:                              10/9/2019     - Onset: remote  - Triger: multiple procedures  -Unknown etiology for neuropathic pain  - located in: lower back  - intensity: mild to moderate  - quality: dull and sharp   - radiation: no  - alleviating factors are: rest, pain medication occasionally, noted daily  -She weaned her off of all medication, except the above  - exacerbating factors are: activity  - accompanied:   - no numbness, weakness, tingling, fever, chills  - course: Improved but persistent  - imaging: Multiple  - treatment: as above     No reported history of:  - chronic immune suppression, alcoholism, IV drug abuse, indwelling catheter,  diabetes.   - No history of recent spinal surgery or injection.     Denies:  - numbness/saddle anesthesia.  - bowel/bladder changes, fever.   - trauma  - nausea/vomiting  - chest pain, shortness of breath, abdominal pain.     Recurrent ear infection  The patient has h/o recurrent ear infections.  Denies fever, chills, nasal congestion, sore throat.  She used clindamycin due to multiple antibiotic allergies.    Reviewed PMH, PSH, FH, SH, ALL, HCM/IMM, no changes  Reviewed MEDS, no changes    Patient Active Problem List    Diagnosis Date Noted   • Chronic use of opiate drug for therapeutic purpose 08/10/2020   • Uncomplicated opioid dependence (HCC) 08/10/2020   • Encounter for screening mammogram for malignant neoplasm of breast 08/10/2020   • Arthralgia 05/26/2020   • Fatty liver 10/09/2019   • Hypovitaminosis D 10/09/2019   • Hypercholesterolemia 10/09/2019   • Leukopenia 10/09/2019   • Controlled substance agreement signed 10/09/2019   • Muscle spasm 10/09/2019   • Healthcare maintenance 10/09/2019   • Neck pain 09/07/2019   • Peripheral neuropathic pain 09/07/2019   • Fatigue 09/07/2019   • Dyslipidemia 09/07/2019   • Health care maintenance 09/07/2019     CURRENT MEDICATIONS  Current Outpatient Medications   Medication Sig Dispense Refill   • clindamycin (CLEOCIN) 300 MG Cap Take 1 capsule by mouth 3 times a day. 30 capsule 0   • carisoprodol (SOMA) 350 MG Tab Take 1 tablet by mouth 4 times a day for 120 days. 120 tablet 0   • fluconazole (DIFLUCAN) 10 MG/ML Recon Susp Take 30 mg QD  mL 0   • hydrochlorothiazide (MICROZIDE) 12.5 MG capsule Take 1 capsule by mouth every day. 90 capsule 0   • meclizine (ANTIVERT) 25 MG Tab Take 1 tablet by mouth 3 times a day as needed. 90 tablet 0   • omeprazole (PRILOSEC) 20 MG delayed-release capsule Take 1 capsule by mouth every day. 90 capsule 0   • traZODone (DESYREL) 50 MG Tab Take 1 tablet by mouth at bedtime as needed for Sleep. 90 tablet 0   • [START ON 7/6/2021]  oxyCODONE immediate-release (ROXICODONE) 5 MG Tab Take 1 tablet by mouth every four hours as needed for Severe Pain for up to 30 days. 60 tablet 0   • [START ON 6/8/2021] oxyCODONE immediate-release (ROXICODONE) 5 MG Tab Take 1 tablet by mouth every four hours as needed for Severe Pain for up to 30 days. 60 tablet 0   • oxyCODONE immediate-release (ROXICODONE) 5 MG Tab Take 1 tablet by mouth every four hours as needed for Severe Pain for up to 30 days. 60 tablet 0     No current facility-administered medications for this visit.     ALLERGIES  Allergies: Bee venom, Codeine, Iodine, Penicillins, Vancomycin, Aspirin, Ibuprofen, Latex, and Percocet [apap-fd&c red #40 [place]-oxycodone]  PAST MEDICAL HISTORY  Past Medical History:   Diagnosis Date   • Dyslipidemia    • Fatty liver    • Hypoglycemia    • Hypovitaminosis D    • Knee osteoarthritis    • Peripheral neuropathy      SURGICAL HISTORY  She  has a past surgical history that includes primary c section and ankle arthroscopy.  SOCIAL HISTORY  Social History     Tobacco Use   • Smoking status: Never Smoker   • Smokeless tobacco: Never Used   Substance Use Topics   • Alcohol use: Yes     Comment: rarely   • Drug use: Never     Social History     Social History Narrative   • Not on file     FAMILY HISTORY  Family History   Problem Relation Age of Onset   • Diabetes Mother    • Hypertension Mother    • Hyperlipidemia Mother    • Heart Disease Father    • Hypertension Father    • Hyperlipidemia Father    • No Known Problems Sister    • No Known Problems Brother      Family Status   Relation Name Status   • Mo  Alive   • Fa  Alive   • Sis  Alive   • Bro  Alive     ROS   Constitutional: Negative for fever, chills, fatigue.  HENT: Negative for congestion, sore throat. And per HPI  Eyes: Negative for vision problems.   Respiratory: Negative for cough, shortness of breath.  Cardiovascular: Negative for chest pain, palpitations.   Gastrointestinal: Negative for heartburn,  "nausea, abdominal pain.   Musculoskeletal: Per HPI.   Skin: Negative for rash.   Neuro: Negative for dizziness, weakness and headaches.   Endo/Heme/Allergies: Does not bruise/bleed easily.   Psychiatric/Behavioral: Negative for depression.    Objective   Vitals obtained by patient:  Temperature 36.2 °C (97.2 °F)   Height 1.702 m (5' 7\")   Weight 83.9 kg (185 lb)   Body Mass Index 28.98 kg/m²   Physical Exam:  Constitutional: Alert, no distress, well-groomed.  Skin: No rash in visible areas.  Eye: Round. Conjunctiva clear, lids normal.  ENMT: Lips pink without lesions, good dentition. Phonation normal.  Neck: No visible masses or thyromegaly. Moves freely without pain.  CV: no peripheral cyanosis, tachycardia.  Respiratory: Unlabored respiratory effort, no cough or audible wheezing.  Psych: Alert and oriented x3, normal affect and mood.     Labs     Labs are reviewed and discussed with a patient  Lab Results   Component Value Date/Time    CHOLSTRLTOT 253 (H) 05/26/2020 10:36 AM     (H) 05/26/2020 10:36 AM    HDL 65 05/26/2020 10:36 AM    TRIGLYCERIDE 97 05/26/2020 10:36 AM       Lab Results   Component Value Date/Time    SODIUM 138 05/26/2020 10:36 AM    POTASSIUM 4.3 05/26/2020 10:36 AM    CHLORIDE 104 05/26/2020 10:36 AM    CO2 24 05/26/2020 10:36 AM    GLUCOSE 87 05/26/2020 10:36 AM    BUN 18 05/26/2020 10:36 AM    CREATININE 0.69 05/26/2020 10:36 AM     Lab Results   Component Value Date/Time    ALKPHOSPHAT 64 05/26/2020 10:36 AM    ASTSGOT 17 05/26/2020 10:36 AM    ALTSGPT 15 05/26/2020 10:36 AM    TBILIRUBIN 0.8 05/26/2020 10:36 AM      No results found for: HBA1C  No results found for: TSH  No results found for: FREET4    Lab Results   Component Value Date/Time    WBC 3.9 (L) 05/26/2020 10:36 AM    RBC 4.90 05/26/2020 10:36 AM    HEMOGLOBIN 14.9 05/26/2020 10:36 AM    HEMATOCRIT 45.4 05/26/2020 10:36 AM    MCV 92.7 05/26/2020 10:36 AM    MCH 30.4 05/26/2020 10:36 AM    MCHC 32.8 (L) 05/26/2020 10:36 " AM    MPV 9.9 05/26/2020 10:36 AM    NEUTSPOLYS 43.50 (L) 05/26/2020 10:36 AM    LYMPHOCYTES 44.30 (H) 05/26/2020 10:36 AM    MONOCYTES 8.50 05/26/2020 10:36 AM    EOSINOPHILS 2.60 05/26/2020 10:36 AM    BASOPHILS 0.80 05/26/2020 10:36 AM      Imaging      None    Assessment and Plan     Serena Rogers is a 55 y.o. female    1. Neck pain  2. Peripheral neuropathic pain  3. Arthralgia, unspecified joint  4. Muscle spasm  - Controlled, continue with current management.    5. Controlled substance agreement signed  6. Chronic prescription opiate use  7. Uncomplicated opioid dependence (HCC)  Obtained and reviewed patient utilization report from [facility] pharmacy database on 5/10/2021 9:18 AM  prior to writing prescription for controlled substance II, III or IV per [place] bill . Based on assessment of the report, the prescription is medically necessary.     8. Encounter for screening mammogram for malignant neoplasm of breast  - MA-SCREENING MAMMO BILAT W/TOMOSYNTHESIS W/CAD; Future    Follow-up: prn

## 2022-06-24 ENCOUNTER — HOSPITAL ENCOUNTER (OUTPATIENT)
Dept: LAB | Facility: MEDICAL CENTER | Age: 57
End: 2022-06-24
Attending: INTERNAL MEDICINE
Payer: COMMERCIAL

## 2022-06-24 LAB
ALBUMIN SERPL BCP-MCNC: 4.4 G/DL (ref 3.2–4.9)
ALBUMIN/GLOB SERPL: 1.8 G/DL
ALP SERPL-CCNC: 61 U/L (ref 30–99)
ALT SERPL-CCNC: 13 U/L (ref 2–50)
ANION GAP SERPL CALC-SCNC: 10 MMOL/L (ref 7–16)
APPEARANCE UR: ABNORMAL
AST SERPL-CCNC: 18 U/L (ref 12–45)
BACTERIA #/AREA URNS HPF: NEGATIVE /HPF
BASOPHILS # BLD AUTO: 0.8 % (ref 0–1.8)
BASOPHILS # BLD: 0.05 K/UL (ref 0–0.12)
BILIRUB SERPL-MCNC: 0.6 MG/DL (ref 0.1–1.5)
BILIRUB UR QL STRIP.AUTO: NEGATIVE
BUN SERPL-MCNC: 14 MG/DL (ref 8–22)
CALCIUM SERPL-MCNC: 9.1 MG/DL (ref 8.5–10.5)
CAOX CRY #/AREA URNS HPF: NORMAL /HPF
CHLORIDE SERPL-SCNC: 106 MMOL/L (ref 96–112)
CO2 SERPL-SCNC: 25 MMOL/L (ref 20–33)
COLOR UR: YELLOW
CREAT SERPL-MCNC: 0.8 MG/DL (ref 0.5–1.4)
EOSINOPHIL # BLD AUTO: 0.11 K/UL (ref 0–0.51)
EOSINOPHIL NFR BLD: 1.9 % (ref 0–6.9)
EPI CELLS #/AREA URNS HPF: NEGATIVE /HPF
ERYTHROCYTE [DISTWIDTH] IN BLOOD BY AUTOMATED COUNT: 46.2 FL (ref 35.9–50)
GFR SERPLBLD CREATININE-BSD FMLA CKD-EPI: 85 ML/MIN/1.73 M 2
GLOBULIN SER CALC-MCNC: 2.5 G/DL (ref 1.9–3.5)
GLUCOSE SERPL-MCNC: 108 MG/DL (ref 65–99)
GLUCOSE UR STRIP.AUTO-MCNC: NEGATIVE MG/DL
HCT VFR BLD AUTO: 43.5 % (ref 37–47)
HGB BLD-MCNC: 14.6 G/DL (ref 12–16)
HYALINE CASTS #/AREA URNS LPF: NORMAL /LPF
IMM GRANULOCYTES # BLD AUTO: 0.01 K/UL (ref 0–0.11)
IMM GRANULOCYTES NFR BLD AUTO: 0.2 % (ref 0–0.9)
KETONES UR STRIP.AUTO-MCNC: NEGATIVE MG/DL
LEUKOCYTE ESTERASE UR QL STRIP.AUTO: NEGATIVE
LIPASE SERPL-CCNC: 43 U/L (ref 11–82)
LYMPHOCYTES # BLD AUTO: 2 K/UL (ref 1–4.8)
LYMPHOCYTES NFR BLD: 33.7 % (ref 22–41)
MCH RBC QN AUTO: 30.9 PG (ref 27–33)
MCHC RBC AUTO-ENTMCNC: 33.6 G/DL (ref 33.6–35)
MCV RBC AUTO: 92.2 FL (ref 81.4–97.8)
MICRO URNS: ABNORMAL
MONOCYTES # BLD AUTO: 0.54 K/UL (ref 0–0.85)
MONOCYTES NFR BLD AUTO: 9.1 % (ref 0–13.4)
NEUTROPHILS # BLD AUTO: 3.23 K/UL (ref 2–7.15)
NEUTROPHILS NFR BLD: 54.3 % (ref 44–72)
NITRITE UR QL STRIP.AUTO: NEGATIVE
NRBC # BLD AUTO: 0 K/UL
NRBC BLD-RTO: 0 /100 WBC
PH UR STRIP.AUTO: 5.5 [PH] (ref 5–8)
PLATELET # BLD AUTO: 266 K/UL (ref 164–446)
PMV BLD AUTO: 9.6 FL (ref 9–12.9)
POTASSIUM SERPL-SCNC: 4.2 MMOL/L (ref 3.6–5.5)
PROT SERPL-MCNC: 6.9 G/DL (ref 6–8.2)
PROT UR QL STRIP: NEGATIVE MG/DL
RBC # BLD AUTO: 4.72 M/UL (ref 4.2–5.4)
RBC # URNS HPF: NORMAL /HPF
RBC UR QL AUTO: NEGATIVE
SODIUM SERPL-SCNC: 141 MMOL/L (ref 135–145)
SP GR UR STRIP.AUTO: 1.02
UROBILINOGEN UR STRIP.AUTO-MCNC: 0.2 MG/DL
WBC # BLD AUTO: 5.9 K/UL (ref 4.8–10.8)
WBC #/AREA URNS HPF: NORMAL /HPF

## 2022-06-24 PROCEDURE — 80053 COMPREHEN METABOLIC PANEL: CPT

## 2022-06-24 PROCEDURE — 81001 URINALYSIS AUTO W/SCOPE: CPT

## 2022-06-24 PROCEDURE — 36415 COLL VENOUS BLD VENIPUNCTURE: CPT

## 2022-06-24 PROCEDURE — 85025 COMPLETE CBC W/AUTO DIFF WBC: CPT

## 2022-06-24 PROCEDURE — 83690 ASSAY OF LIPASE: CPT

## 2022-10-05 ENCOUNTER — HOSPITAL ENCOUNTER (OUTPATIENT)
Dept: LAB | Facility: MEDICAL CENTER | Age: 57
End: 2022-10-05
Attending: INTERNAL MEDICINE
Payer: COMMERCIAL

## 2022-10-05 LAB
T3FREE SERPL-MCNC: 2.88 PG/ML (ref 2–4.4)
T4 SERPL-MCNC: 6.4 UG/DL (ref 4–12)
TSH SERPL DL<=0.005 MIU/L-ACNC: 2.79 UIU/ML (ref 0.38–5.33)

## 2022-10-05 PROCEDURE — 84443 ASSAY THYROID STIM HORMONE: CPT

## 2022-10-05 PROCEDURE — 36415 COLL VENOUS BLD VENIPUNCTURE: CPT

## 2022-10-05 PROCEDURE — 84481 FREE ASSAY (FT-3): CPT

## 2022-10-05 PROCEDURE — 84436 ASSAY OF TOTAL THYROXINE: CPT

## 2022-12-28 ENCOUNTER — HOSPITAL ENCOUNTER (OUTPATIENT)
Dept: LAB | Facility: MEDICAL CENTER | Age: 57
End: 2022-12-28
Attending: INTERNAL MEDICINE
Payer: COMMERCIAL

## 2022-12-28 LAB
ALBUMIN SERPL BCP-MCNC: 4.5 G/DL (ref 3.2–4.9)
ALBUMIN/GLOB SERPL: 1.9 G/DL
ALP SERPL-CCNC: 62 U/L (ref 30–99)
ALT SERPL-CCNC: 18 U/L (ref 2–50)
ANION GAP SERPL CALC-SCNC: 10 MMOL/L (ref 7–16)
AST SERPL-CCNC: 20 U/L (ref 12–45)
BILIRUB SERPL-MCNC: 0.9 MG/DL (ref 0.1–1.5)
BUN SERPL-MCNC: 18 MG/DL (ref 8–22)
CALCIUM ALBUM COR SERPL-MCNC: 8.8 MG/DL (ref 8.5–10.5)
CALCIUM SERPL-MCNC: 9.2 MG/DL (ref 8.5–10.5)
CHLORIDE SERPL-SCNC: 105 MMOL/L (ref 96–112)
CHOLEST SERPL-MCNC: 250 MG/DL (ref 100–199)
CO2 SERPL-SCNC: 24 MMOL/L (ref 20–33)
CREAT SERPL-MCNC: 0.77 MG/DL (ref 0.5–1.4)
FASTING STATUS PATIENT QL REPORTED: NORMAL
GFR SERPLBLD CREATININE-BSD FMLA CKD-EPI: 89 ML/MIN/1.73 M 2
GLOBULIN SER CALC-MCNC: 2.4 G/DL (ref 1.9–3.5)
GLUCOSE SERPL-MCNC: 89 MG/DL (ref 65–99)
HDLC SERPL-MCNC: 56 MG/DL
LDLC SERPL CALC-MCNC: 169 MG/DL
POTASSIUM SERPL-SCNC: 4.4 MMOL/L (ref 3.6–5.5)
PROT SERPL-MCNC: 6.9 G/DL (ref 6–8.2)
SODIUM SERPL-SCNC: 139 MMOL/L (ref 135–145)
TRIGL SERPL-MCNC: 125 MG/DL (ref 0–149)

## 2022-12-28 PROCEDURE — 80053 COMPREHEN METABOLIC PANEL: CPT

## 2022-12-28 PROCEDURE — 36415 COLL VENOUS BLD VENIPUNCTURE: CPT

## 2022-12-28 PROCEDURE — 80061 LIPID PANEL: CPT
